# Patient Record
Sex: FEMALE | Race: WHITE | NOT HISPANIC OR LATINO | URBAN - METROPOLITAN AREA
[De-identification: names, ages, dates, MRNs, and addresses within clinical notes are randomized per-mention and may not be internally consistent; named-entity substitution may affect disease eponyms.]

---

## 2019-02-15 ENCOUNTER — OUTPATIENT (OUTPATIENT)
Dept: OUTPATIENT SERVICES | Facility: HOSPITAL | Age: 29
LOS: 1 days | End: 2019-02-15
Payer: COMMERCIAL

## 2019-02-15 VITALS
RESPIRATION RATE: 18 BRPM | HEIGHT: 65 IN | HEART RATE: 73 BPM | WEIGHT: 134.92 LBS | TEMPERATURE: 100 F | DIASTOLIC BLOOD PRESSURE: 77 MMHG | OXYGEN SATURATION: 100 % | SYSTOLIC BLOOD PRESSURE: 115 MMHG

## 2019-02-15 DIAGNOSIS — Z01.818 ENCOUNTER FOR OTHER PREPROCEDURAL EXAMINATION: ICD-10-CM

## 2019-02-15 DIAGNOSIS — Z98.82 BREAST IMPLANT STATUS: Chronic | ICD-10-CM

## 2019-02-15 DIAGNOSIS — K08.409 PARTIAL LOSS OF TEETH, UNSPECIFIED CAUSE, UNSPECIFIED CLASS: Chronic | ICD-10-CM

## 2019-02-15 DIAGNOSIS — O02.1 MISSED ABORTION: ICD-10-CM

## 2019-02-15 PROCEDURE — G0463: CPT

## 2019-02-15 PROCEDURE — 86900 BLOOD TYPING SEROLOGIC ABO: CPT

## 2019-02-15 PROCEDURE — 85027 COMPLETE CBC AUTOMATED: CPT

## 2019-02-15 PROCEDURE — 86850 RBC ANTIBODY SCREEN: CPT

## 2019-02-15 PROCEDURE — 86901 BLOOD TYPING SEROLOGIC RH(D): CPT

## 2019-02-15 RX ORDER — LIDOCAINE HCL 20 MG/ML
0.2 VIAL (ML) INJECTION ONCE
Qty: 0 | Refills: 0 | Status: DISCONTINUED | OUTPATIENT
Start: 2019-02-19 | End: 2019-03-06

## 2019-02-15 RX ORDER — SODIUM CHLORIDE 9 MG/ML
3 INJECTION INTRAMUSCULAR; INTRAVENOUS; SUBCUTANEOUS EVERY 8 HOURS
Qty: 0 | Refills: 0 | Status: DISCONTINUED | OUTPATIENT
Start: 2019-02-19 | End: 2019-03-06

## 2019-02-15 NOTE — H&P PST ADULT - NSANTHOSAYNRD_GEN_A_CORE
No. DE screening performed.  STOP BANG Legend: 0-2 = LOW Risk; 3-4 = INTERMEDIATE Risk; 5-8 = HIGH Risk

## 2019-02-15 NOTE — H&P PST ADULT - BLOOD AVOIDANCE/RESTRICTIONS, PROFILE
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   - UA showed impressive finding of UTI and it is complicated given long Hx of Bailey cathter for incontinent   - Previous urine culture grew Enterococcus faecalis sensitive to Penicillin   - Received fluid resuscitation ~ 2000 mL and Zosyn   - Continue with Zosyn (to cover possible Pseudomonas)   - Urine and blood remain NGTD.   none

## 2019-02-18 ENCOUNTER — TRANSCRIPTION ENCOUNTER (OUTPATIENT)
Age: 29
End: 2019-02-18

## 2019-02-19 ENCOUNTER — OUTPATIENT (OUTPATIENT)
Dept: OUTPATIENT SERVICES | Facility: HOSPITAL | Age: 29
LOS: 1 days | End: 2019-02-19
Payer: COMMERCIAL

## 2019-02-19 VITALS
SYSTOLIC BLOOD PRESSURE: 115 MMHG | TEMPERATURE: 100 F | OXYGEN SATURATION: 100 % | RESPIRATION RATE: 18 BRPM | HEART RATE: 73 BPM | WEIGHT: 134.92 LBS | DIASTOLIC BLOOD PRESSURE: 77 MMHG | HEIGHT: 65 IN

## 2019-02-19 VITALS
DIASTOLIC BLOOD PRESSURE: 52 MMHG | HEART RATE: 84 BPM | RESPIRATION RATE: 20 BRPM | TEMPERATURE: 98 F | OXYGEN SATURATION: 99 % | SYSTOLIC BLOOD PRESSURE: 92 MMHG

## 2019-02-19 DIAGNOSIS — Z98.82 BREAST IMPLANT STATUS: Chronic | ICD-10-CM

## 2019-02-19 DIAGNOSIS — O02.1 MISSED ABORTION: ICD-10-CM

## 2019-02-19 DIAGNOSIS — K08.409 PARTIAL LOSS OF TEETH, UNSPECIFIED CAUSE, UNSPECIFIED CLASS: Chronic | ICD-10-CM

## 2019-02-19 PROCEDURE — 88264 CHROMOSOME ANALYSIS 20-25: CPT

## 2019-02-19 PROCEDURE — 88291 CYTO/MOLECULAR REPORT: CPT

## 2019-02-19 PROCEDURE — 88305 TISSUE EXAM BY PATHOLOGIST: CPT

## 2019-02-19 PROCEDURE — 88305 TISSUE EXAM BY PATHOLOGIST: CPT | Mod: 26

## 2019-02-19 PROCEDURE — 88280 CHROMOSOME KARYOTYPE STUDY: CPT

## 2019-02-19 PROCEDURE — 88233 TISSUE CULTURE SKIN/BIOPSY: CPT

## 2019-02-19 PROCEDURE — 59820 CARE OF MISCARRIAGE: CPT

## 2019-02-19 RX ORDER — ACETAMINOPHEN 500 MG
1000 TABLET ORAL ONCE
Qty: 0 | Refills: 0 | Status: COMPLETED | OUTPATIENT
Start: 2019-02-19 | End: 2019-02-19

## 2019-02-19 RX ORDER — SODIUM CHLORIDE 9 MG/ML
1000 INJECTION, SOLUTION INTRAVENOUS
Qty: 0 | Refills: 0 | Status: DISCONTINUED | OUTPATIENT
Start: 2019-02-19 | End: 2019-03-06

## 2019-02-19 RX ORDER — CELECOXIB 200 MG/1
200 CAPSULE ORAL ONCE
Qty: 0 | Refills: 0 | Status: DISCONTINUED | OUTPATIENT
Start: 2019-02-19 | End: 2019-03-06

## 2019-02-19 RX ORDER — OXYCODONE HYDROCHLORIDE 5 MG/1
5 TABLET ORAL ONCE
Qty: 0 | Refills: 0 | Status: DISCONTINUED | OUTPATIENT
Start: 2019-02-19 | End: 2019-02-19

## 2019-02-19 RX ORDER — ONDANSETRON 8 MG/1
4 TABLET, FILM COATED ORAL ONCE
Qty: 0 | Refills: 0 | Status: DISCONTINUED | OUTPATIENT
Start: 2019-02-19 | End: 2019-03-06

## 2019-02-19 RX ORDER — CELECOXIB 200 MG/1
200 CAPSULE ORAL ONCE
Qty: 0 | Refills: 0 | Status: COMPLETED | OUTPATIENT
Start: 2019-02-19 | End: 2019-02-19

## 2019-02-19 RX ORDER — FAMOTIDINE 10 MG/ML
20 INJECTION INTRAVENOUS ONCE
Qty: 0 | Refills: 0 | Status: COMPLETED | OUTPATIENT
Start: 2019-02-19 | End: 2019-02-19

## 2019-02-19 RX ADMIN — SODIUM CHLORIDE 3 MILLILITER(S): 9 INJECTION INTRAMUSCULAR; INTRAVENOUS; SUBCUTANEOUS at 08:52

## 2019-02-19 RX ADMIN — FAMOTIDINE 20 MILLIGRAM(S): 10 INJECTION INTRAVENOUS at 09:16

## 2019-02-19 RX ADMIN — Medication 1000 MILLIGRAM(S): at 08:49

## 2019-02-19 RX ADMIN — CELECOXIB 200 MILLIGRAM(S): 200 CAPSULE ORAL at 08:50

## 2019-02-19 NOTE — ASU DISCHARGE PLAN (ADULT/PEDIATRIC). - NOTIFY
Bleeding that does not stop/Fever greater than 101 Bleeding that does not stop/GYN Fever>100.4/Unable to Urinate

## 2019-02-19 NOTE — ASU PATIENT PROFILE, ADULT - PRESSURE ULCER(S)
Assumed care of pt @ 3130. JUMA drain removed per order, midline placed, and cordis removed. Pt converted to Afib at 1015. Cardiology aware. See orders. Pt asymptomatic, rate controlled. Transfer orders in.  Report called, all pt belongings taken up to CTU no

## 2019-02-19 NOTE — BRIEF OPERATIVE NOTE - PROCEDURE
<<-----Click on this checkbox to enter Procedure Dilatation and curettage  02/19/2019    Active  SISSYARO

## 2019-02-20 LAB — SURGICAL PATHOLOGY STUDY: SIGNIFICANT CHANGE UP

## 2019-03-06 LAB — CHROM ANALY OVERALL INTERP SPEC-IMP: SIGNIFICANT CHANGE UP

## 2019-03-13 PROBLEM — Z00.00 ENCOUNTER FOR PREVENTIVE HEALTH EXAMINATION: Status: ACTIVE | Noted: 2019-03-13

## 2019-03-27 ENCOUNTER — APPOINTMENT (OUTPATIENT)
Dept: ORTHOPEDIC SURGERY | Facility: CLINIC | Age: 29
End: 2019-03-27
Payer: COMMERCIAL

## 2019-03-27 VITALS
HEART RATE: 79 BPM | SYSTOLIC BLOOD PRESSURE: 122 MMHG | HEIGHT: 65 IN | BODY MASS INDEX: 22.49 KG/M2 | WEIGHT: 135 LBS | DIASTOLIC BLOOD PRESSURE: 77 MMHG

## 2019-03-27 DIAGNOSIS — Z78.9 OTHER SPECIFIED HEALTH STATUS: ICD-10-CM

## 2019-03-27 DIAGNOSIS — Z82.61 FAMILY HISTORY OF ARTHRITIS: ICD-10-CM

## 2019-03-27 DIAGNOSIS — M22.41 CHONDROMALACIA PATELLAE, RIGHT KNEE: ICD-10-CM

## 2019-03-27 DIAGNOSIS — Z80.3 FAMILY HISTORY OF MALIGNANT NEOPLASM OF BREAST: ICD-10-CM

## 2019-03-27 PROCEDURE — 73564 X-RAY EXAM KNEE 4 OR MORE: CPT | Mod: RT

## 2019-03-27 PROCEDURE — 99203 OFFICE O/P NEW LOW 30 MIN: CPT

## 2019-03-27 RX ORDER — PNV/FERROUS SULFATE/FOLIC ACID 27-<0.5MG
TABLET ORAL
Refills: 0 | Status: ACTIVE | COMMUNITY

## 2019-03-27 NOTE — PHYSICAL EXAM
[de-identified] : General Exam\par \par Well developed, well nourished\par No apparent distress\par Oriented to person, place, and time\par Mood: Normal\par Affect: Normal\par Balance and coordination: Normal\par Gait: Normal\par \par right knee exam\par \par Skin: Clean, dry, intact\par Inspection: No obvious malalignment, no masses, no swelling, no effusion.\par Tenderness: no MJLT. No LJLT. + tenderness over the medial and lateral patella facets. No ttp medial/lateral epicondyle, patella tendon, tibial tubercle, pes anserinus, or proximal fibula.\par ROM: 0 to 130° no pain with deep flexion in both knees\par Stability: Stable to varus, valgus, lachman testing. Negative anterior/posterior drawer.\par Additional tests: Negative McMurrays test, Negative patellar grind test. \par Strength: 5/5 Q/H/TA/GS/EHL, no atrophy\par Neuro: In tact to light touch throughout in dp/sp/tib/siohban/saph nerve districutions, DTR's normal\par Pulses: 2+ DP/PT pulses.\par  [de-identified] : \par The following radiographs were ordered and read by me during this patients visit. I reviewed each radiograph in detail with the patient and discussed the findings as highlighted below. \par \par 4 views right knee] were obtained today that show no fracture, dislocation. There is no degenerative change seen. There is no malalignment. No obvious osseous abnormality. Otherwise unremarkable.\par

## 2019-03-27 NOTE — DISCUSSION/SUMMARY
[de-identified] : \par 28-year-old female right knee patellofemoral pain. Given home exercise program anti-inflammatory medications as needed followup as needed. All questions answered

## 2019-03-27 NOTE — HISTORY OF PRESENT ILLNESS
[de-identified] : 28 y.o F presents to the office today c.o of R knee pain lasting about 1 month or less. She denies any traumatic BRODERICK but believes she injured it while weight lifting early March. She describes most of the pain towards the lateral aspect of her patella, and the lateral side of her R thigh along the distal IT band. She complains of pain with deep squatting. States she has not had to stop working out and her ADL's are not affected. Denies numbness and tingling. No significant PHI.\par \par The patient's past medical history, past surgical history, medications, allergies, and social history were reviewed by me today with the patient and documented accordingly. In addition, the patient's family history, which is noncontributory to this visit, was also reviewed.\par

## 2019-09-12 ENCOUNTER — APPOINTMENT (OUTPATIENT)
Dept: ANTEPARTUM | Facility: CLINIC | Age: 29
End: 2019-09-12
Payer: COMMERCIAL

## 2019-09-12 ENCOUNTER — ASOB RESULT (OUTPATIENT)
Age: 29
End: 2019-09-12

## 2019-09-12 PROCEDURE — 76817 TRANSVAGINAL US OBSTETRIC: CPT | Mod: 59

## 2019-09-12 PROCEDURE — 76811 OB US DETAILED SNGL FETUS: CPT

## 2019-09-18 ENCOUNTER — APPOINTMENT (OUTPATIENT)
Dept: ANTEPARTUM | Facility: CLINIC | Age: 29
End: 2019-09-18
Payer: COMMERCIAL

## 2019-09-18 ENCOUNTER — ASOB RESULT (OUTPATIENT)
Age: 29
End: 2019-09-18

## 2019-09-18 PROCEDURE — 76815 OB US LIMITED FETUS(S): CPT

## 2019-10-17 ENCOUNTER — APPOINTMENT (OUTPATIENT)
Dept: ANTEPARTUM | Facility: CLINIC | Age: 29
End: 2019-10-17
Payer: COMMERCIAL

## 2019-10-17 ENCOUNTER — ASOB RESULT (OUTPATIENT)
Age: 29
End: 2019-10-17

## 2019-10-17 PROCEDURE — 76816 OB US FOLLOW-UP PER FETUS: CPT

## 2019-11-20 ENCOUNTER — ASOB RESULT (OUTPATIENT)
Age: 29
End: 2019-11-20

## 2019-11-20 ENCOUNTER — APPOINTMENT (OUTPATIENT)
Dept: ANTEPARTUM | Facility: CLINIC | Age: 29
End: 2019-11-20
Payer: COMMERCIAL

## 2019-11-20 PROCEDURE — 76816 OB US FOLLOW-UP PER FETUS: CPT

## 2019-11-22 ENCOUNTER — APPOINTMENT (OUTPATIENT)
Dept: ANTEPARTUM | Facility: CLINIC | Age: 29
End: 2019-11-22

## 2019-11-25 ENCOUNTER — APPOINTMENT (OUTPATIENT)
Dept: ANTEPARTUM | Facility: CLINIC | Age: 29
End: 2019-11-25

## 2019-12-19 ENCOUNTER — APPOINTMENT (OUTPATIENT)
Dept: ANTEPARTUM | Facility: CLINIC | Age: 29
End: 2019-12-19
Payer: COMMERCIAL

## 2019-12-19 ENCOUNTER — ASOB RESULT (OUTPATIENT)
Age: 29
End: 2019-12-19

## 2019-12-19 PROCEDURE — 76816 OB US FOLLOW-UP PER FETUS: CPT

## 2020-02-01 ENCOUNTER — INPATIENT (INPATIENT)
Facility: HOSPITAL | Age: 30
LOS: 1 days | Discharge: HOME | End: 2020-02-03
Attending: OBSTETRICS & GYNECOLOGY | Admitting: OBSTETRICS & GYNECOLOGY
Payer: COMMERCIAL

## 2020-02-01 VITALS — HEIGHT: 65 IN | WEIGHT: 158.73 LBS

## 2020-02-01 DIAGNOSIS — Z98.82 BREAST IMPLANT STATUS: Chronic | ICD-10-CM

## 2020-02-01 DIAGNOSIS — K08.409 PARTIAL LOSS OF TEETH, UNSPECIFIED CAUSE, UNSPECIFIED CLASS: Chronic | ICD-10-CM

## 2020-02-01 DIAGNOSIS — Z34.80 ENCOUNTER FOR SUPERVISION OF OTHER NORMAL PREGNANCY, UNSPECIFIED TRIMESTER: ICD-10-CM

## 2020-02-01 DIAGNOSIS — O26.899 OTHER SPECIFIED PREGNANCY RELATED CONDITIONS, UNSPECIFIED TRIMESTER: ICD-10-CM

## 2020-02-01 DIAGNOSIS — Z3A.00 WEEKS OF GESTATION OF PREGNANCY NOT SPECIFIED: ICD-10-CM

## 2020-02-01 LAB
ALBUMIN SERPL ELPH-MCNC: 3.8 G/DL — SIGNIFICANT CHANGE UP (ref 3.3–5)
ALP SERPL-CCNC: 212 U/L — HIGH (ref 40–120)
ALT FLD-CCNC: 10 U/L — SIGNIFICANT CHANGE UP (ref 10–45)
ANION GAP SERPL CALC-SCNC: 13 MMOL/L — SIGNIFICANT CHANGE UP (ref 5–17)
APTT BLD: 27.8 SEC — SIGNIFICANT CHANGE UP (ref 27.5–36.3)
AST SERPL-CCNC: 20 U/L — SIGNIFICANT CHANGE UP (ref 10–40)
BASOPHILS # BLD AUTO: 0.05 K/UL — SIGNIFICANT CHANGE UP (ref 0–0.2)
BASOPHILS NFR BLD AUTO: 0.4 % — SIGNIFICANT CHANGE UP (ref 0–2)
BILIRUB SERPL-MCNC: 0.2 MG/DL — SIGNIFICANT CHANGE UP (ref 0.2–1.2)
BLD GP AB SCN SERPL QL: NEGATIVE — SIGNIFICANT CHANGE UP
BUN SERPL-MCNC: 13 MG/DL — SIGNIFICANT CHANGE UP (ref 7–23)
CALCIUM SERPL-MCNC: 9.9 MG/DL — SIGNIFICANT CHANGE UP (ref 8.4–10.5)
CHLORIDE SERPL-SCNC: 100 MMOL/L — SIGNIFICANT CHANGE UP (ref 96–108)
CO2 SERPL-SCNC: 23 MMOL/L — SIGNIFICANT CHANGE UP (ref 22–31)
CREAT ?TM UR-MCNC: 64 MG/DL — SIGNIFICANT CHANGE UP
CREAT SERPL-MCNC: 0.83 MG/DL — SIGNIFICANT CHANGE UP (ref 0.5–1.3)
EOSINOPHIL # BLD AUTO: 0.04 K/UL — SIGNIFICANT CHANGE UP (ref 0–0.5)
EOSINOPHIL NFR BLD AUTO: 0.3 % — SIGNIFICANT CHANGE UP (ref 0–6)
FIBRINOGEN PPP-MCNC: 670 MG/DL — HIGH (ref 350–510)
GLUCOSE SERPL-MCNC: 82 MG/DL — SIGNIFICANT CHANGE UP (ref 70–99)
HCT VFR BLD CALC: 43.9 % — SIGNIFICANT CHANGE UP (ref 34.5–45)
HGB BLD-MCNC: 14.2 G/DL — SIGNIFICANT CHANGE UP (ref 11.5–15.5)
IMM GRANULOCYTES NFR BLD AUTO: 0.5 % — SIGNIFICANT CHANGE UP (ref 0–1.5)
INR BLD: 0.99 RATIO — SIGNIFICANT CHANGE UP (ref 0.88–1.16)
LDH SERPL L TO P-CCNC: 202 U/L — SIGNIFICANT CHANGE UP (ref 50–242)
LYMPHOCYTES # BLD AUTO: 15 % — SIGNIFICANT CHANGE UP (ref 13–44)
LYMPHOCYTES # BLD AUTO: 2.03 K/UL — SIGNIFICANT CHANGE UP (ref 1–3.3)
MCHC RBC-ENTMCNC: 28.3 PG — SIGNIFICANT CHANGE UP (ref 27–34)
MCHC RBC-ENTMCNC: 32.3 GM/DL — SIGNIFICANT CHANGE UP (ref 32–36)
MCV RBC AUTO: 87.6 FL — SIGNIFICANT CHANGE UP (ref 80–100)
MONOCYTES # BLD AUTO: 0.71 K/UL — SIGNIFICANT CHANGE UP (ref 0–0.9)
MONOCYTES NFR BLD AUTO: 5.2 % — SIGNIFICANT CHANGE UP (ref 2–14)
NEUTROPHILS # BLD AUTO: 10.67 K/UL — HIGH (ref 1.8–7.4)
NEUTROPHILS NFR BLD AUTO: 78.6 % — HIGH (ref 43–77)
NRBC # BLD: 0 /100 WBCS — SIGNIFICANT CHANGE UP (ref 0–0)
PLATELET # BLD AUTO: 276 K/UL — SIGNIFICANT CHANGE UP (ref 150–400)
POTASSIUM SERPL-MCNC: 4.3 MMOL/L — SIGNIFICANT CHANGE UP (ref 3.5–5.3)
POTASSIUM SERPL-SCNC: 4.3 MMOL/L — SIGNIFICANT CHANGE UP (ref 3.5–5.3)
PROT ?TM UR-MCNC: 9 MG/DL — SIGNIFICANT CHANGE UP (ref 0–12)
PROT SERPL-MCNC: 7.6 G/DL — SIGNIFICANT CHANGE UP (ref 6–8.3)
PROT/CREAT UR-RTO: 0.1 RATIO — SIGNIFICANT CHANGE UP (ref 0–0.2)
PROTHROM AB SERPL-ACNC: 11.3 SEC — SIGNIFICANT CHANGE UP (ref 10–12.9)
RBC # BLD: 5.01 M/UL — SIGNIFICANT CHANGE UP (ref 3.8–5.2)
RBC # FLD: 12.6 % — SIGNIFICANT CHANGE UP (ref 10.3–14.5)
RH IG SCN BLD-IMP: POSITIVE — SIGNIFICANT CHANGE UP
SODIUM SERPL-SCNC: 136 MMOL/L — SIGNIFICANT CHANGE UP (ref 135–145)
T PALLIDUM AB TITR SER: NEGATIVE — SIGNIFICANT CHANGE UP
URATE SERPL-MCNC: 6 MG/DL — SIGNIFICANT CHANGE UP (ref 2.5–7)
WBC # BLD: 13.57 K/UL — HIGH (ref 3.8–10.5)
WBC # FLD AUTO: 13.57 K/UL — HIGH (ref 3.8–10.5)

## 2020-02-01 PROCEDURE — 88307 TISSUE EXAM BY PATHOLOGIST: CPT | Mod: 26

## 2020-02-01 RX ORDER — OXYTOCIN 10 UNIT/ML
333.33 VIAL (ML) INJECTION
Qty: 20 | Refills: 0 | Status: DISCONTINUED | OUTPATIENT
Start: 2020-02-01 | End: 2020-02-03

## 2020-02-01 RX ORDER — AMPICILLIN TRIHYDRATE 250 MG
1 CAPSULE ORAL EVERY 4 HOURS
Refills: 0 | Status: DISCONTINUED | OUTPATIENT
Start: 2020-02-01 | End: 2020-02-01

## 2020-02-01 RX ORDER — AMPICILLIN TRIHYDRATE 250 MG
2 CAPSULE ORAL ONCE
Refills: 0 | Status: COMPLETED | OUTPATIENT
Start: 2020-02-01 | End: 2020-02-01

## 2020-02-01 RX ORDER — SODIUM CHLORIDE 9 MG/ML
3 INJECTION INTRAMUSCULAR; INTRAVENOUS; SUBCUTANEOUS EVERY 8 HOURS
Refills: 0 | Status: DISCONTINUED | OUTPATIENT
Start: 2020-02-01 | End: 2020-02-03

## 2020-02-01 RX ORDER — IBUPROFEN 200 MG
600 TABLET ORAL EVERY 6 HOURS
Refills: 0 | Status: DISCONTINUED | OUTPATIENT
Start: 2020-02-01 | End: 2020-02-03

## 2020-02-01 RX ORDER — GLYCERIN ADULT
1 SUPPOSITORY, RECTAL RECTAL AT BEDTIME
Refills: 0 | Status: DISCONTINUED | OUTPATIENT
Start: 2020-02-01 | End: 2020-02-03

## 2020-02-01 RX ORDER — ACETAMINOPHEN 500 MG
1000 TABLET ORAL ONCE
Refills: 0 | Status: COMPLETED | OUTPATIENT
Start: 2020-02-01 | End: 2020-02-01

## 2020-02-01 RX ORDER — BENZOCAINE 10 %
1 GEL (GRAM) MUCOUS MEMBRANE EVERY 6 HOURS
Refills: 0 | Status: DISCONTINUED | OUTPATIENT
Start: 2020-02-01 | End: 2020-02-03

## 2020-02-01 RX ORDER — SIMETHICONE 80 MG/1
80 TABLET, CHEWABLE ORAL EVERY 4 HOURS
Refills: 0 | Status: DISCONTINUED | OUTPATIENT
Start: 2020-02-01 | End: 2020-02-03

## 2020-02-01 RX ORDER — OXYCODONE HYDROCHLORIDE 5 MG/1
5 TABLET ORAL ONCE
Refills: 0 | Status: DISCONTINUED | OUTPATIENT
Start: 2020-02-01 | End: 2020-02-03

## 2020-02-01 RX ORDER — OXYCODONE HYDROCHLORIDE 5 MG/1
5 TABLET ORAL
Refills: 0 | Status: DISCONTINUED | OUTPATIENT
Start: 2020-02-01 | End: 2020-02-03

## 2020-02-01 RX ORDER — DIBUCAINE 1 %
1 OINTMENT (GRAM) RECTAL EVERY 6 HOURS
Refills: 0 | Status: DISCONTINUED | OUTPATIENT
Start: 2020-02-01 | End: 2020-02-03

## 2020-02-01 RX ORDER — HYDROCORTISONE 1 %
1 OINTMENT (GRAM) TOPICAL EVERY 6 HOURS
Refills: 0 | Status: DISCONTINUED | OUTPATIENT
Start: 2020-02-01 | End: 2020-02-03

## 2020-02-01 RX ORDER — ACETAMINOPHEN 500 MG
975 TABLET ORAL
Refills: 0 | Status: DISCONTINUED | OUTPATIENT
Start: 2020-02-01 | End: 2020-02-03

## 2020-02-01 RX ORDER — SODIUM CHLORIDE 9 MG/ML
1000 INJECTION, SOLUTION INTRAVENOUS
Refills: 0 | Status: DISCONTINUED | OUTPATIENT
Start: 2020-02-01 | End: 2020-02-01

## 2020-02-01 RX ORDER — LANOLIN
1 OINTMENT (GRAM) TOPICAL EVERY 6 HOURS
Refills: 0 | Status: DISCONTINUED | OUTPATIENT
Start: 2020-02-01 | End: 2020-02-03

## 2020-02-01 RX ORDER — GENTAMICIN SULFATE 40 MG/ML
315 VIAL (ML) INJECTION ONCE
Refills: 0 | Status: COMPLETED | OUTPATIENT
Start: 2020-02-01 | End: 2020-02-01

## 2020-02-01 RX ORDER — PRAMOXINE HYDROCHLORIDE 150 MG/15G
1 AEROSOL, FOAM RECTAL EVERY 4 HOURS
Refills: 0 | Status: DISCONTINUED | OUTPATIENT
Start: 2020-02-01 | End: 2020-02-03

## 2020-02-01 RX ORDER — IBUPROFEN 200 MG
600 TABLET ORAL EVERY 6 HOURS
Refills: 0 | Status: COMPLETED | OUTPATIENT
Start: 2020-02-01 | End: 2020-12-30

## 2020-02-01 RX ORDER — OXYTOCIN 10 UNIT/ML
333.33 VIAL (ML) INJECTION
Qty: 20 | Refills: 0 | Status: COMPLETED | OUTPATIENT
Start: 2020-02-01 | End: 2020-02-01

## 2020-02-01 RX ORDER — DIPHENHYDRAMINE HCL 50 MG
25 CAPSULE ORAL EVERY 6 HOURS
Refills: 0 | Status: DISCONTINUED | OUTPATIENT
Start: 2020-02-01 | End: 2020-02-03

## 2020-02-01 RX ORDER — TETANUS TOXOID, REDUCED DIPHTHERIA TOXOID AND ACELLULAR PERTUSSIS VACCINE, ADSORBED 5; 2.5; 8; 8; 2.5 [IU]/.5ML; [IU]/.5ML; UG/.5ML; UG/.5ML; UG/.5ML
0.5 SUSPENSION INTRAMUSCULAR ONCE
Refills: 0 | Status: DISCONTINUED | OUTPATIENT
Start: 2020-02-01 | End: 2020-02-03

## 2020-02-01 RX ORDER — KETOROLAC TROMETHAMINE 30 MG/ML
30 SYRINGE (ML) INJECTION ONCE
Refills: 0 | Status: DISCONTINUED | OUTPATIENT
Start: 2020-02-01 | End: 2020-02-01

## 2020-02-01 RX ORDER — MAGNESIUM HYDROXIDE 400 MG/1
30 TABLET, CHEWABLE ORAL
Refills: 0 | Status: DISCONTINUED | OUTPATIENT
Start: 2020-02-01 | End: 2020-02-03

## 2020-02-01 RX ORDER — AER TRAVELER 0.5 G/1
1 SOLUTION RECTAL; TOPICAL EVERY 4 HOURS
Refills: 0 | Status: DISCONTINUED | OUTPATIENT
Start: 2020-02-01 | End: 2020-02-03

## 2020-02-01 RX ORDER — SODIUM CHLORIDE 9 MG/ML
1000 INJECTION, SOLUTION INTRAVENOUS
Refills: 0 | Status: DISCONTINUED | OUTPATIENT
Start: 2020-02-01 | End: 2020-02-03

## 2020-02-01 RX ORDER — CITRIC ACID/SODIUM CITRATE 300-500 MG
15 SOLUTION, ORAL ORAL EVERY 6 HOURS
Refills: 0 | Status: DISCONTINUED | OUTPATIENT
Start: 2020-02-01 | End: 2020-02-01

## 2020-02-01 RX ORDER — OXYTOCIN 10 UNIT/ML
4 VIAL (ML) INJECTION
Qty: 30 | Refills: 0 | Status: DISCONTINUED | OUTPATIENT
Start: 2020-02-01 | End: 2020-02-01

## 2020-02-01 RX ADMIN — Medication 4 MILLIUNIT(S)/MIN: at 15:00

## 2020-02-01 RX ADMIN — Medication 1000 MILLIUNIT(S)/MIN: at 18:24

## 2020-02-01 RX ADMIN — SODIUM CHLORIDE 125 MILLILITER(S): 9 INJECTION, SOLUTION INTRAVENOUS at 14:06

## 2020-02-01 RX ADMIN — Medication 157.88 MILLIGRAM(S): at 17:30

## 2020-02-01 RX ADMIN — Medication 400 MILLIGRAM(S): at 16:25

## 2020-02-01 RX ADMIN — Medication 975 MILLIGRAM(S): at 23:07

## 2020-02-01 RX ADMIN — Medication 30 MILLIGRAM(S): at 20:31

## 2020-02-01 RX ADMIN — Medication 108 GRAM(S): at 15:13

## 2020-02-01 RX ADMIN — Medication 975 MILLIGRAM(S): at 23:37

## 2020-02-01 RX ADMIN — Medication 216 GRAM(S): at 11:15

## 2020-02-01 RX ADMIN — SODIUM CHLORIDE 3 MILLILITER(S): 9 INJECTION INTRAMUSCULAR; INTRAVENOUS; SUBCUTANEOUS at 22:28

## 2020-02-02 LAB
HCT VFR BLD CALC: 32.7 % — LOW (ref 34.5–45)
HGB BLD-MCNC: 10.7 G/DL — LOW (ref 11.5–15.5)

## 2020-02-02 RX ORDER — LEVOTHYROXINE SODIUM 125 MCG
50 TABLET ORAL DAILY
Refills: 0 | Status: DISCONTINUED | OUTPATIENT
Start: 2020-02-02 | End: 2020-02-03

## 2020-02-02 RX ADMIN — Medication 600 MILLIGRAM(S): at 15:25

## 2020-02-02 RX ADMIN — Medication 1 TABLET(S): at 11:32

## 2020-02-02 RX ADMIN — Medication 975 MILLIGRAM(S): at 18:10

## 2020-02-02 RX ADMIN — Medication 600 MILLIGRAM(S): at 09:35

## 2020-02-02 RX ADMIN — Medication 600 MILLIGRAM(S): at 21:15

## 2020-02-02 RX ADMIN — Medication 600 MILLIGRAM(S): at 20:45

## 2020-02-02 RX ADMIN — Medication 600 MILLIGRAM(S): at 02:31

## 2020-02-02 RX ADMIN — Medication 50 MICROGRAM(S): at 08:47

## 2020-02-02 RX ADMIN — SODIUM CHLORIDE 3 MILLILITER(S): 9 INJECTION INTRAMUSCULAR; INTRAVENOUS; SUBCUTANEOUS at 06:45

## 2020-02-02 RX ADMIN — Medication 975 MILLIGRAM(S): at 06:02

## 2020-02-02 RX ADMIN — Medication 975 MILLIGRAM(S): at 17:31

## 2020-02-02 RX ADMIN — Medication 975 MILLIGRAM(S): at 05:32

## 2020-02-02 RX ADMIN — Medication 975 MILLIGRAM(S): at 23:31

## 2020-02-02 RX ADMIN — Medication 975 MILLIGRAM(S): at 11:32

## 2020-02-02 RX ADMIN — Medication 600 MILLIGRAM(S): at 03:01

## 2020-02-02 RX ADMIN — Medication 600 MILLIGRAM(S): at 08:47

## 2020-02-02 RX ADMIN — Medication 975 MILLIGRAM(S): at 12:30

## 2020-02-02 RX ADMIN — Medication 600 MILLIGRAM(S): at 14:28

## 2020-02-02 NOTE — PROGRESS NOTE ADULT - ATTENDING COMMENTS
PPD1 s/p , doing well   -Routine postpartum care   -CBC appropriate   -powers in place due to extensive repair, will remove at 7PM today.     Demi Talley, DO

## 2020-02-02 NOTE — PROGRESS NOTE ADULT - SUBJECTIVE AND OBJECTIVE BOX
OB Progress Note:  PPD#1    S: 28yo  PPD#1 s/p  w/ 2nd degree and periurethral lacerations.  Intrapartum course c/b fever 38.0 for which patient received A/G/T. Patient complains of pain and soreness but feel swell overall. She is tolerating a regular diet and passing flatus. Guerra catheter was placed and is draining adequate urine. Denies CP/SOB. Denies lightheadedness/dizziness. Denies N/V.    O:  Vitals:  Vital Signs Last 24 Hrs  T(C): 36.8 (2020 05:31), Max: 38.8 (2020 19:05)  T(F): 98.2 (2020 05:31), Max: 101.8 (2020 19:05)  HR: 68 (2020 05:31) (64 - 93)  BP: 121/79 (2020 05:31) (105/60 - 127/85)  BP(mean): --  RR: 18 (2020 05:31) (18 - 19)  SpO2: 97% (2020 05:31) (94% - 100%)    MEDICATIONS  (STANDING):  acetaminophen   Tablet .. 975 milliGRAM(s) Oral <User Schedule>  dextrose 5% + lactated ringers. 1000 milliLiter(s) (125 mL/Hr) IV Continuous <Continuous>  diphtheria/tetanus/pertussis (acellular) Vaccine (ADAcel) 0.5 milliLiter(s) IntraMuscular once  ibuprofen  Tablet. 600 milliGRAM(s) Oral every 6 hours  oxytocin Infusion 333.333 milliUNIT(s)/Min (1000 mL/Hr) IV Continuous <Continuous>  prenatal multivitamin 1 Tablet(s) Oral daily  sodium chloride 0.9% lock flush 3 milliLiter(s) IV Push every 8 hours      Labs:  Blood type: AB Positive  Rubella IgG: RPR: Negative                          14.2   13.57<H> >-----------< 276    (  @ 11:49 )             43.9    20 @ 11:49      136  |  100  |  13  ----------------------------<  82  4.3   |  23  |  0.83        Ca    9.9      2020 11:49    TPro  7.6  /  Alb  3.8  /  TBili  0.2  /  DBili  x   /  AST  20  /  ALT  10  /  AlkPhos  212<H>  20 @ 11:49        Physical Exam:  General: NAD  Abdomen: soft, non-tender, non-distended, fundus firm  Vaginal: Lochia wnl  Extremities: No erythema/edema

## 2020-02-02 NOTE — PROGRESS NOTE ADULT - PROBLEM SELECTOR PLAN 1
- continue current pain regimen  - Increase ambulation, SCDs when not ambulating  - Continue regular diet  - d/w Mari @24hrs PP (7pm)

## 2020-02-03 ENCOUNTER — TRANSCRIPTION ENCOUNTER (OUTPATIENT)
Age: 30
End: 2020-02-03

## 2020-02-03 VITALS
RESPIRATION RATE: 18 BRPM | HEART RATE: 69 BPM | TEMPERATURE: 98 F | DIASTOLIC BLOOD PRESSURE: 70 MMHG | SYSTOLIC BLOOD PRESSURE: 110 MMHG

## 2020-02-03 PROCEDURE — 85014 HEMATOCRIT: CPT

## 2020-02-03 PROCEDURE — 84550 ASSAY OF BLOOD/URIC ACID: CPT

## 2020-02-03 PROCEDURE — G0463: CPT

## 2020-02-03 PROCEDURE — 59025 FETAL NON-STRESS TEST: CPT

## 2020-02-03 PROCEDURE — 85018 HEMOGLOBIN: CPT

## 2020-02-03 PROCEDURE — 85027 COMPLETE CBC AUTOMATED: CPT

## 2020-02-03 PROCEDURE — 59050 FETAL MONITOR W/REPORT: CPT

## 2020-02-03 PROCEDURE — 85730 THROMBOPLASTIN TIME PARTIAL: CPT

## 2020-02-03 PROCEDURE — 83615 LACTATE (LD) (LDH) ENZYME: CPT

## 2020-02-03 PROCEDURE — 86780 TREPONEMA PALLIDUM: CPT

## 2020-02-03 PROCEDURE — 85384 FIBRINOGEN ACTIVITY: CPT

## 2020-02-03 PROCEDURE — 88307 TISSUE EXAM BY PATHOLOGIST: CPT

## 2020-02-03 PROCEDURE — 85610 PROTHROMBIN TIME: CPT

## 2020-02-03 PROCEDURE — 84156 ASSAY OF PROTEIN URINE: CPT

## 2020-02-03 PROCEDURE — 82570 ASSAY OF URINE CREATININE: CPT

## 2020-02-03 PROCEDURE — 80053 COMPREHEN METABOLIC PANEL: CPT

## 2020-02-03 RX ORDER — IBUPROFEN 200 MG
1 TABLET ORAL
Qty: 0 | Refills: 0 | DISCHARGE
Start: 2020-02-03

## 2020-02-03 RX ORDER — ACETAMINOPHEN 500 MG
3 TABLET ORAL
Qty: 0 | Refills: 0 | DISCHARGE
Start: 2020-02-03

## 2020-02-03 RX ADMIN — Medication 600 MILLIGRAM(S): at 09:46

## 2020-02-03 RX ADMIN — Medication 975 MILLIGRAM(S): at 00:02

## 2020-02-03 RX ADMIN — Medication 50 MICROGRAM(S): at 05:27

## 2020-02-03 RX ADMIN — Medication 600 MILLIGRAM(S): at 03:10

## 2020-02-03 RX ADMIN — Medication 600 MILLIGRAM(S): at 09:16

## 2020-02-03 RX ADMIN — Medication 975 MILLIGRAM(S): at 06:04

## 2020-02-03 RX ADMIN — Medication 975 MILLIGRAM(S): at 05:27

## 2020-02-03 RX ADMIN — Medication 975 MILLIGRAM(S): at 12:30

## 2020-02-03 RX ADMIN — Medication 600 MILLIGRAM(S): at 03:41

## 2020-02-03 RX ADMIN — Medication 1 TABLET(S): at 12:00

## 2020-02-03 RX ADMIN — Medication 975 MILLIGRAM(S): at 12:00

## 2020-02-03 NOTE — DISCHARGE NOTE OB - CARE PROVIDER_API CALL
Maryjane Vasquez (DO)  [facility] [address], [address]  [place] [id number]  Phone: [phone]  Fax: [phone]  Follow Up Time:

## 2020-02-03 NOTE — PROGRESS NOTE ADULT - PROBLEM SELECTOR PLAN 1
Plan:   - Increase ambulation, SCDs when not ambulating  - Continue regular diet  - Continue PO pain regimen: Ibuprofen, Acetaminophen and Oxycodone PRN  - Discharge planning      [place], PGY-1

## 2020-02-03 NOTE — DISCHARGE NOTE OB - ADDITIONAL INSTRUCTIONS
Please call if you have fever above 100.4, pain uncontrolled with pain medicine, bleeding soaking through two pads an hour for two hours.

## 2020-02-03 NOTE — DISCHARGE NOTE OB - CARE PLAN
Principal Discharge DX:	Vaginal delivery  Goal:	ambulate and pain control  Assessment and plan of treatment:	ambulate and pain control

## 2020-02-03 NOTE — PROGRESS NOTE ADULT - ASSESSMENT
30yo now postpartum day 2 from a  c/b periurethral and 2nd degree lacerations, as well as intrapartum temperature (>24hrs afebrile) recovering well.

## 2020-02-03 NOTE — DISCHARGE NOTE OB - HOSPITAL COURSE
Patient admitted for labor. Patient had powers in for 24 hours after delivery due to periurethral laceration close to urethra. Voided spontaneously after powers removal. Discharged home on post partum day 2.

## 2020-02-03 NOTE — DISCHARGE NOTE OB - PATIENT PORTAL LINK FT
You can access the FollowMyHealth Patient Portal offered by [facility] by registering at the following website: [email or web address]. By joining [facility] [facility] [employer]’s FollowMyHealth portal, you will also be able to view your health information using other applications (apps) compatible with our system.

## 2020-02-03 NOTE — PROGRESS NOTE ADULT - SUBJECTIVE AND OBJECTIVE BOX
OB Attending Note    S: Patient doing well. Minimal lochia. Pain controlled. No dizziness, lightheaded. Ambulating and tolerating PO. Voiding spontaneously and without difficulty    O: Vital Signs Last 24 Hrs  T(C): 36.7 (03 Feb 2020 05:23), Max: 36.9 (02 Feb 2020 17:15)  T(F): 98 (03 Feb 2020 05:23), Max: 98.4 (02 Feb 2020 17:15)  HR: 69 (03 Feb 2020 05:23) (69 - 98)  BP: 110/70 (03 Feb 2020 05:23) (110/70 - 112/72)  BP(mean): --  RR: 18 (03 Feb 2020 05:23) (18 - 18)  SpO2: --    Gen: NAD  Abd: soft, NT, ND, fundus firm below umbilicus  Lochia: min  Perineum healing well  Ext: no tenderness    Labs:                        10.7   x     )-----------( x        ( 02 Feb 2020 06:25 )             32.7

## 2020-02-03 NOTE — PROGRESS NOTE ADULT - SUBJECTIVE AND OBJECTIVE BOX
30yo now PPD#2 after  c/b second degree and periurethral lacerations, as well as intrapartum fever of 38.0. Patient is now s/p A/G/T (intrapartum) and has been afebrile >24hrs. Her Guerra catheter was removed at 7PM 2/, 24hrs postpartum, and she is voiding freely.  Patient denies current complaints, her pain is well controlled. States she is ambulating, voiding spontaneously, passing gas, and tolerating regular diet. Denies CP, SOB, N/V, HA, blurred vision, epigastric pain.    Vital Signs Last 24 Hours  T(C): 36.9 (20 @ 17:15), Max: 37 (20 @ 09:00)  HR: 98 (20 @ 17:15) (68 - 98)  BP: 112/72 (20 @ 17:15) (112/72 - 121/80)  RR: 18 (20 @ 17:15) (18 - 18)  SpO2: 96% (20 @ 09:00) (96% - 97%)    Physical Exam:  General: NAD, resting comfortably in bed   Abdomen: Soft, non-tender, non-distended, fundus firm  Extremities: Full ROM, moving all extremities spontaneously  Pelvic: Lochia wnl    Labs:    Blood Type: AB Positive  Antibody Screen: Negative  RPR: Negative               10.7   x     )-----------( x        (  @ 06:25 )             32.7                14.2   13.57 )-----------( 276      (  @ 11:49 )             43.9         MEDICATIONS  (STANDING):  acetaminophen   Tablet .. 975 milliGRAM(s) Oral <User Schedule>  dextrose 5% + lactated ringers. 1000 milliLiter(s) (125 mL/Hr) IV Continuous <Continuous>  diphtheria/tetanus/pertussis (acellular) Vaccine (ADAcel) 0.5 milliLiter(s) IntraMuscular once  ibuprofen  Tablet. 600 milliGRAM(s) Oral every 6 hours  levothyroxine 50 MICROGram(s) Oral daily  oxytocin Infusion 333.333 milliUNIT(s)/Min (1000 mL/Hr) IV Continuous <Continuous>  prenatal multivitamin 1 Tablet(s) Oral daily  sodium chloride 0.9% lock flush 3 milliLiter(s) IV Push every 8 hours    MEDICATIONS  (PRN):  benzocaine 20%/menthol 0.5% Spray 1 Spray(s) Topical every 6 hours PRN for Perineal discomfort  dibucaine 1% Ointment 1 Application(s) Topical every 6 hours PRN Perineal discomfort  diphenhydrAMINE 25 milliGRAM(s) Oral every 6 hours PRN Pruritus  glycerin Suppository - Adult 1 Suppository(s) Rectal at bedtime PRN Constipation  hydrocortisone 1% Cream 1 Application(s) Topical every 6 hours PRN Moderate Pain (4-6)  lanolin Ointment 1 Application(s) Topical every 6 hours PRN nipple soreness  magnesium hydroxide Suspension 30 milliLiter(s) Oral two times a day PRN Constipation  oxyCODONE    IR 5 milliGRAM(s) Oral every 3 hours PRN Moderate to Severe Pain (4-10)  oxyCODONE    IR 5 milliGRAM(s) Oral once PRN Moderate to Severe Pain (4-10)  pramoxine 1%/zinc 5% Cream 1 Application(s) Topical every 4 hours PRN Moderate Pain (4-6)  simethicone 80 milliGRAM(s) Chew every 4 hours PRN Gas  witch hazel Pads 1 Application(s) Topical every 4 hours PRN Perineal discomfort

## 2020-02-15 LAB — SURGICAL PATHOLOGY STUDY: SIGNIFICANT CHANGE UP

## 2020-03-13 ENCOUNTER — RESULT REVIEW (OUTPATIENT)
Age: 30
End: 2020-03-13

## 2020-06-04 NOTE — DISCHARGE NOTE OB - REDNESS, SWELLING, YELLOW-GREEN OR BLOODY DISCHARGE FROM YOUR INCISION
Arielle Montenegro is requesting to transfer care to [facility] from her previous clinic [facility] because se estates she does not like the clinic and providers.  Initial OB visit note on 20, prior to that she had care with [facility] per OV notes.  Per [facility] protocol, all transfer prenatal patients that are equal to or greater than 24 weeks need prior approval from [facility] before scheduling any prenatal appointments.      3 Para 2    EDC 20, states she is scheduled for c/s 7/3/20    U/S done? Yes, see imaging tab    Previous C-sec? Yes, 2 previous c-sections, one with us in 2015    List all major health problems: Pt is Type 1 diabetic, microalbuminuria    List all complications of past deliveries: ( Ask specifically about Gestational Diabetes, HTN and preeclampsia) Preeclampsia with first pregnancy, not with second one     List all current pregnancy issues: (Again, ask specifically about Gestational Diabetes, HTN and preeclampsia) Type 1 diabetic    List current medication list: Baby ASA, PNV, insulin    Per protocol, message routed to MD on-call for direction.     I advised pt we may not be able to accept her care due to the complexity and late stage in her pregnancy.    Asha Cadet RN      
Called pt to notify that we would accept transfer.  Wanted to make sure she could still have her  on 7/3 because she wants a holiday baby.  Informed pt that that would be discussed and decided with new provider.  At this point decided to not transfer and continue care at [facility].  Heather Jhaveri RN    
I will accept!   Dr. Destinee Villarreal, DO    Obstetrics and Gynecology  [facility] [place]         
Statement Selected

## 2021-03-05 ENCOUNTER — TRANSCRIPTION ENCOUNTER (OUTPATIENT)
Age: 31
End: 2021-03-05

## 2021-03-14 ENCOUNTER — TRANSCRIPTION ENCOUNTER (OUTPATIENT)
Age: 31
End: 2021-03-14

## 2021-04-20 ENCOUNTER — RESULT REVIEW (OUTPATIENT)
Age: 31
End: 2021-04-20

## 2022-01-12 ENCOUNTER — APPOINTMENT (OUTPATIENT)
Dept: ANTEPARTUM | Facility: CLINIC | Age: 32
End: 2022-01-12
Payer: COMMERCIAL

## 2022-01-12 ENCOUNTER — ASOB RESULT (OUTPATIENT)
Age: 32
End: 2022-01-12

## 2022-01-12 PROCEDURE — 76817 TRANSVAGINAL US OBSTETRIC: CPT

## 2022-01-12 PROCEDURE — 76805 OB US >/= 14 WKS SNGL FETUS: CPT

## 2022-01-26 ENCOUNTER — APPOINTMENT (OUTPATIENT)
Dept: ANTEPARTUM | Facility: CLINIC | Age: 32
End: 2022-01-26
Payer: COMMERCIAL

## 2022-01-26 ENCOUNTER — ASOB RESULT (OUTPATIENT)
Age: 32
End: 2022-01-26

## 2022-01-26 PROCEDURE — 76816 OB US FOLLOW-UP PER FETUS: CPT

## 2022-01-31 NOTE — PATIENT PROFILE OB - HEIGHT IN INCHES
5 Thalidomide Counseling: I discussed with the patient the risks of thalidomide including but not limited to birth defects, anxiety, weakness, chest pain, dizziness, cough and severe allergy.

## 2022-04-06 ENCOUNTER — APPOINTMENT (OUTPATIENT)
Dept: ANTEPARTUM | Facility: CLINIC | Age: 32
End: 2022-04-06

## 2022-05-02 ENCOUNTER — OUTPATIENT (OUTPATIENT)
Dept: OUTPATIENT SERVICES | Facility: HOSPITAL | Age: 32
LOS: 1 days | End: 2022-05-02
Payer: COMMERCIAL

## 2022-05-02 ENCOUNTER — ASOB RESULT (OUTPATIENT)
Age: 32
End: 2022-05-02

## 2022-05-02 ENCOUNTER — APPOINTMENT (OUTPATIENT)
Dept: ANTEPARTUM | Facility: CLINIC | Age: 32
End: 2022-05-02
Payer: COMMERCIAL

## 2022-05-02 DIAGNOSIS — Z11.52 ENCOUNTER FOR SCREENING FOR COVID-19: ICD-10-CM

## 2022-05-02 DIAGNOSIS — Z98.82 BREAST IMPLANT STATUS: Chronic | ICD-10-CM

## 2022-05-02 DIAGNOSIS — K08.409 PARTIAL LOSS OF TEETH, UNSPECIFIED CAUSE, UNSPECIFIED CLASS: Chronic | ICD-10-CM

## 2022-05-02 LAB — SARS-COV-2 RNA SPEC QL NAA+PROBE: SIGNIFICANT CHANGE UP

## 2022-05-02 PROCEDURE — 59412 ANTEPARTUM MANIPULATION: CPT

## 2022-05-02 PROCEDURE — 76816 OB US FOLLOW-UP PER FETUS: CPT

## 2022-05-02 PROCEDURE — 76819 FETAL BIOPHYS PROFIL W/O NST: CPT

## 2022-05-02 PROCEDURE — U0005: CPT

## 2022-05-02 PROCEDURE — U0003: CPT

## 2022-05-02 PROCEDURE — C9803: CPT

## 2022-05-03 ENCOUNTER — OUTPATIENT (OUTPATIENT)
Dept: OUTPATIENT SERVICES | Facility: HOSPITAL | Age: 32
LOS: 1 days | End: 2022-05-03
Payer: COMMERCIAL

## 2022-05-03 ENCOUNTER — ASOB RESULT (OUTPATIENT)
Age: 32
End: 2022-05-03

## 2022-05-03 ENCOUNTER — APPOINTMENT (OUTPATIENT)
Dept: ANTEPARTUM | Facility: CLINIC | Age: 32
End: 2022-05-03

## 2022-05-03 VITALS
SYSTOLIC BLOOD PRESSURE: 124 MMHG | HEART RATE: 94 BPM | TEMPERATURE: 99 F | DIASTOLIC BLOOD PRESSURE: 75 MMHG | RESPIRATION RATE: 18 BRPM

## 2022-05-03 VITALS — SYSTOLIC BLOOD PRESSURE: 126 MMHG | DIASTOLIC BLOOD PRESSURE: 73 MMHG | OXYGEN SATURATION: 96 % | HEART RATE: 88 BPM

## 2022-05-03 DIAGNOSIS — K08.409 PARTIAL LOSS OF TEETH, UNSPECIFIED CAUSE, UNSPECIFIED CLASS: Chronic | ICD-10-CM

## 2022-05-03 DIAGNOSIS — Z3A.00 WEEKS OF GESTATION OF PREGNANCY NOT SPECIFIED: ICD-10-CM

## 2022-05-03 DIAGNOSIS — O26.899 OTHER SPECIFIED PREGNANCY RELATED CONDITIONS, UNSPECIFIED TRIMESTER: ICD-10-CM

## 2022-05-03 DIAGNOSIS — Z98.82 BREAST IMPLANT STATUS: Chronic | ICD-10-CM

## 2022-05-03 LAB
HCT VFR BLD CALC: 38.4 % — SIGNIFICANT CHANGE UP (ref 34.5–45)
HGB BLD-MCNC: 12.8 G/DL — SIGNIFICANT CHANGE UP (ref 11.5–15.5)
MCHC RBC-ENTMCNC: 28.1 PG — SIGNIFICANT CHANGE UP (ref 27–34)
MCHC RBC-ENTMCNC: 33.3 GM/DL — SIGNIFICANT CHANGE UP (ref 32–36)
MCV RBC AUTO: 84.2 FL — SIGNIFICANT CHANGE UP (ref 80–100)
NRBC # BLD: 0 /100 WBCS — SIGNIFICANT CHANGE UP (ref 0–0)
PLATELET # BLD AUTO: 330 K/UL — SIGNIFICANT CHANGE UP (ref 150–400)
RBC # BLD: 4.56 M/UL — SIGNIFICANT CHANGE UP (ref 3.8–5.2)
RBC # FLD: 12.5 % — SIGNIFICANT CHANGE UP (ref 10.3–14.5)
WBC # BLD: 11.9 K/UL — HIGH (ref 3.8–10.5)
WBC # FLD AUTO: 11.9 K/UL — HIGH (ref 3.8–10.5)

## 2022-05-03 PROCEDURE — 85027 COMPLETE CBC AUTOMATED: CPT

## 2022-05-03 PROCEDURE — 59025 FETAL NON-STRESS TEST: CPT | Mod: 26

## 2022-05-03 PROCEDURE — G0463: CPT

## 2022-05-03 PROCEDURE — 59025 FETAL NON-STRESS TEST: CPT

## 2022-05-03 RX ADMIN — Medication 0.25 MILLIGRAM(S): at 10:50

## 2022-05-03 NOTE — PRE-ANESTHESIA EVALUATION ADULT - NSANTHPMHFT_GEN_ALL_CORE
s/p  , epidural w/o cx's  s/p D&C  attempted version this morning, epidural subsequently requested by ZITA

## 2022-05-03 NOTE — OB RN TRIAGE NOTE - FALL HARM RISK - UNIVERSAL INTERVENTIONS
Bed in lowest position, wheels locked, appropriate side rails in place/Call bell, personal items and telephone in reach/Instruct patient to call for assistance before getting out of bed or chair/Non-slip footwear when patient is out of bed/[place] to call system/Physically safe environment - no spills, clutter or unnecessary equipment/Purposeful Proactive Rounding/Room/bathroom lighting operational, light cord in reach

## 2022-05-03 NOTE — OB PROVIDER TRIAGE NOTE - NSOBPROVIDERNOTE_OBGYN_ALL_OB_FT
32y/o [id number] at 37w1d presents for scheduled ECV. Fetus in joan breech position. Fetal status reassuring  -CBC, IV lock  -For ECV  -Terb at bedside      D/w Dr. Roger bridges pgy4 30y/o [id number] at 37w1d presents for scheduled ECV. Fetus in joan breech position. Fetal status reassuring  -CBC, IV lock  -For ECV  -Terb at bedside      D/w Dr. Roger bridges pgy4      ---------  BPP performed: Breech, Anterior, MVP 4.9, 8/8  IV lock in place, CBC sent, consents signed  D/w Dr. Rochelson RFrankel PGY3 32y/o [id number] at 37w1d presents for scheduled ECV. Fetus in joan breech position. Fetal status reassuring  -CBC, IV lock  -For ECV  -Terb at bedside      D/w Dr. Roger bridges pgy4      ---------  BPP performed: Breech, Anterior, MVP 4.9, 8/8  IV lock in place, CBC sent, consents signed  D/w Dr. Rochelson RFrankel PGY3    ----  ECV attempted under epidural anesthesia, unsuccessful. Patient tolerated well. Fetal status reassuring. for 1hr NST, then home. Scheduled for pLTCS on 5/16 w/ Dr. Vasquez.     RFrankel PGY3 30y/o [id number] at 37w1d presents for scheduled ECV. Fetus in joan breech position. Fetal status reassuring  -CBC, IV lock  -For ECV  -Terb at bedside      D/w Dr. Duran and Dr. Pedro bridges pgy4      Pt s/p unsuccessful ECV  Reactive tracing x2 hours post  D/c home with labor precautions    D/w Dr. Pedro bridges pgy4      ---------  BPP performed: Breech, Anterior, MVP 4.9, 8/8  IV lock in place, CBC sent, consents signed  D/w Dr. Rochelson RFrankel PGY3    ----  ECV attempted under epidural anesthesia, unsuccessful. Patient tolerated well. Fetal status reassuring. for 1hr NST, then home. Scheduled for pLTCS on 5/16 w/ Dr. Vasquez.     RFrankel PGY3

## 2022-05-03 NOTE — OB PROVIDER TRIAGE NOTE - HISTORY OF PRESENT ILLNESS
32y/o [id number] at 37w1d presents for scheduled ECV. Pt feels well, no complaints. Denies CTX, LOF, VB. +FM  PNC uncomplicated  EFW 6#10    OBhx: 2020  7#11, MAB x2 D&C  GynHx: denies  PMHx: denies  PSHx: denies  Meds: denies  All: NKDA  SH: neg x3

## 2022-05-03 NOTE — CHART NOTE - NSCHARTNOTEFT_GEN_A_CORE
ECV attempted at bedside in triage but unsuccessful. Patient wished to try under epidural anesthesia.   Patient moved to  and epidural placed by Dr. Jane. ECV reattempted however despite multiple attempts, unable to turn fetus.   FHR reassuring throughout process. Patient placed on EFM and to undergo 1hr NST until 2pm.   Pt scheduled for pLTCS on 5/16 with Dr. Vasquez. Would like to reattempt again at that time; to be discussed with private attending.     Procedure performed with Dr. Duran  D/w Dr. Almeida RFrankel PGY3

## 2022-05-03 NOTE — OB PROVIDER TRIAGE NOTE - NSHPPHYSICALEXAM_GEN_ALL_CORE
Vital Signs Last 24 Hrs  T(C): 37.1 (03 May 2022 08:46), Max: 37.1 (03 May 2022 08:38)  T(F): 98.8 (03 May 2022 08:46), Max: 98.8 (03 May 2022 08:46)  HR: 100 (03 May 2022 09:16) (94 - 114)  BP: 124/75 (03 May 2022 08:46) (124/75 - 124/75)  BP(mean): --  RR: 18 (03 May 2022 08:46) (18 - 18)  SpO2: 99% (03 May 2022 09:16) (97% - 100%)  GA: NAD  Cards: RRR  Pulm: CTAB  Abd: soft, gravid, nontender  LE: nontender      TAUS: joan breech, anterior placenta, head maternal right     EFM: 150/mod/+accels/-decels  [place]: irritability

## 2022-05-03 NOTE — PRE-ANESTHESIA EVALUATION ADULT - NSANTHADDINFOFT_GEN_ALL_CORE
epidural anesthesia/analgesia explained to pt in detail;  risks include but not limited to HA, failure, nv injury.  All questions answered.

## 2022-05-10 ENCOUNTER — OUTPATIENT (OUTPATIENT)
Dept: OUTPATIENT SERVICES | Facility: HOSPITAL | Age: 32
LOS: 1 days | End: 2022-05-10
Payer: COMMERCIAL

## 2022-05-10 VITALS
RESPIRATION RATE: 16 BRPM | DIASTOLIC BLOOD PRESSURE: 85 MMHG | WEIGHT: 175.05 LBS | SYSTOLIC BLOOD PRESSURE: 124 MMHG | TEMPERATURE: 99 F | OXYGEN SATURATION: 97 % | HEART RATE: 81 BPM | HEIGHT: 65 IN

## 2022-05-10 DIAGNOSIS — K08.409 PARTIAL LOSS OF TEETH, UNSPECIFIED CAUSE, UNSPECIFIED CLASS: Chronic | ICD-10-CM

## 2022-05-10 DIAGNOSIS — Z29.9 ENCOUNTER FOR PROPHYLACTIC MEASURES, UNSPECIFIED: ICD-10-CM

## 2022-05-10 DIAGNOSIS — Z98.82 BREAST IMPLANT STATUS: Chronic | ICD-10-CM

## 2022-05-10 DIAGNOSIS — Z01.818 ENCOUNTER FOR OTHER PREPROCEDURAL EXAMINATION: ICD-10-CM

## 2022-05-10 DIAGNOSIS — Z98.890 OTHER SPECIFIED POSTPROCEDURAL STATES: Chronic | ICD-10-CM

## 2022-05-10 LAB
ANION GAP SERPL CALC-SCNC: 14 MMOL/L — SIGNIFICANT CHANGE UP (ref 5–17)
BLD GP AB SCN SERPL QL: NEGATIVE — SIGNIFICANT CHANGE UP
BUN SERPL-MCNC: 12 MG/DL — SIGNIFICANT CHANGE UP (ref 7–23)
CALCIUM SERPL-MCNC: 9.4 MG/DL — SIGNIFICANT CHANGE UP (ref 8.4–10.5)
CHLORIDE SERPL-SCNC: 101 MMOL/L — SIGNIFICANT CHANGE UP (ref 96–108)
CO2 SERPL-SCNC: 20 MMOL/L — LOW (ref 22–31)
CREAT SERPL-MCNC: 0.63 MG/DL — SIGNIFICANT CHANGE UP (ref 0.5–1.3)
EGFR: 120 ML/MIN/1.73M2 — SIGNIFICANT CHANGE UP
GLUCOSE SERPL-MCNC: 71 MG/DL — SIGNIFICANT CHANGE UP (ref 70–99)
HCT VFR BLD CALC: 36.9 % — SIGNIFICANT CHANGE UP (ref 34.5–45)
HGB BLD-MCNC: 12.4 G/DL — SIGNIFICANT CHANGE UP (ref 11.5–15.5)
MCHC RBC-ENTMCNC: 28.6 PG — SIGNIFICANT CHANGE UP (ref 27–34)
MCHC RBC-ENTMCNC: 33.6 GM/DL — SIGNIFICANT CHANGE UP (ref 32–36)
MCV RBC AUTO: 85.2 FL — SIGNIFICANT CHANGE UP (ref 80–100)
NRBC # BLD: 0 /100 WBCS — SIGNIFICANT CHANGE UP (ref 0–0)
PLATELET # BLD AUTO: 329 K/UL — SIGNIFICANT CHANGE UP (ref 150–400)
POTASSIUM SERPL-MCNC: 3.7 MMOL/L — SIGNIFICANT CHANGE UP (ref 3.5–5.3)
POTASSIUM SERPL-SCNC: 3.7 MMOL/L — SIGNIFICANT CHANGE UP (ref 3.5–5.3)
RBC # BLD: 4.33 M/UL — SIGNIFICANT CHANGE UP (ref 3.8–5.2)
RBC # FLD: 12.6 % — SIGNIFICANT CHANGE UP (ref 10.3–14.5)
RH IG SCN BLD-IMP: POSITIVE — SIGNIFICANT CHANGE UP
SODIUM SERPL-SCNC: 135 MMOL/L — SIGNIFICANT CHANGE UP (ref 135–145)
WBC # BLD: 11.06 K/UL — HIGH (ref 3.8–10.5)
WBC # FLD AUTO: 11.06 K/UL — HIGH (ref 3.8–10.5)

## 2022-05-10 PROCEDURE — 86900 BLOOD TYPING SEROLOGIC ABO: CPT

## 2022-05-10 PROCEDURE — 86901 BLOOD TYPING SEROLOGIC RH(D): CPT

## 2022-05-10 PROCEDURE — 85027 COMPLETE CBC AUTOMATED: CPT

## 2022-05-10 PROCEDURE — 36415 COLL VENOUS BLD VENIPUNCTURE: CPT

## 2022-05-10 PROCEDURE — G0463: CPT

## 2022-05-10 PROCEDURE — 86850 RBC ANTIBODY SCREEN: CPT

## 2022-05-10 PROCEDURE — 80048 BASIC METABOLIC PNL TOTAL CA: CPT

## 2022-05-10 RX ORDER — OXYTOCIN 10 UNIT/ML
333.33 VIAL (ML) INJECTION
Qty: 20 | Refills: 0 | Status: DISCONTINUED | OUTPATIENT
Start: 2022-05-16 | End: 2022-05-18

## 2022-05-10 RX ORDER — CEFAZOLIN SODIUM 1 G
2000 VIAL (EA) INJECTION ONCE
Refills: 0 | Status: DISCONTINUED | OUTPATIENT
Start: 2022-05-16 | End: 2022-05-18

## 2022-05-10 RX ORDER — SODIUM CHLORIDE 9 MG/ML
1000 INJECTION, SOLUTION INTRAVENOUS
Refills: 0 | Status: DISCONTINUED | OUTPATIENT
Start: 2022-05-16 | End: 2022-05-16

## 2022-05-10 NOTE — OB PST NOTE - FALL HARM RISK - UNIVERSAL INTERVENTIONS
Bed in lowest position, wheels locked, appropriate side rails in place/Call bell, personal items and telephone in reach/Instruct patient to call for assistance before getting out of bed or chair/Non-slip footwear when patient is out of bed/[place] to call system/Physically safe environment - no spills, clutter or unnecessary equipment/Purposeful Proactive Rounding/Room/bathroom lighting operational, light cord in reach

## 2022-05-10 NOTE — OB PST NOTE - PROBLEM SELECTOR PLAN 1
Primary  Section  -cbc, bmp, type and screen done at PST  -preop instructions provided. All questions answered  -type and screen on admit

## 2022-05-10 NOTE — OB PST NOTE - ASSESSMENT
EFRAINI VTE 2.0 SCORE [CLOT updated 2019]    AGE RELATED RISK FACTORS                                                       MOBILITY RELATED FACTORS  [ ] Age 41-60 years                                            (1 Point)                    [ ] Bed rest                                                        (1 Point)  [ ] Age: 61-74 years                                           (2 Points)                  [ ] Plaster cast                                                   (2 Points)  [ ] Age= 75 years                                              (3 Points)                    [ ] Bed bound for more than 72 hours                 (2 Points)    DISEASE RELATED RISK FACTORS                                               GENDER SPECIFIC FACTORS  [ ] Edema in the lower extremities                       (1 Point)              [x] Pregnancy                                                     (1 Point)  [ ] Varicose veins                                               (1 Point)                     [ ] Post-partum < 6 weeks                                   (1 Point)             [x] BMI > 25 Kg/m2                                            (1 Point)                     [ ] Hormonal therapy  or oral contraception          (1 Point)                 [ ] Sepsis (in the previous month)                        (1 Point)               [ ] History of pregnancy complications                 (1 point)  [ ] Pneumonia or serious lung disease                                               [ ] Unexplained or recurrent                     (1 Point)           (in the previous month)                               (1 Point)  [ ] Abnormal pulmonary function test                     (1 Point)                 SURGERY RELATED RISK FACTORS  [ ] Acute myocardial infarction                              (1 Point)               [x]  Section                                             (1 Point)  [ ] Congestive heart failure (in the previous month)  (1 Point)      [ ] Minor surgery                                                  (1 Point)   [ ] Inflammatory bowel disease                             (1 Point)               [ ] Arthroscopic surgery                                        (2 Points)  [ ] Central venous access                                      (2 Points)                [ ] General surgery lasting more than 45 minutes (2 points)  [ ] Malignancy- Present or previous                   (2 Points)                [ ] Elective arthroplasty                                         (5 points)    [ ] Stroke (in the previous month)                          (5 Points)                                                                                                                                                           HEMATOLOGY RELATED FACTORS                                                 TRAUMA RELATED RISK FACTORS  [ ] Prior episodes of VTE                                     (3 Points)                [ ] Fracture of the hip, pelvis, or leg                       (5 Points)  [ ] Positive family history for VTE                         (3 Points)             [ ] Acute spinal cord injury (in the previous month)  (5 Points)  [ ] Prothrombin 02079 A                                     (3 Points)               [ ] Paralysis  (less than 1 month)                             (5 Points)  [ ] Factor V Leiden                                             (3 Points)                  [ ] Multiple Trauma within 1 month                        (5 Points)  [ ] Lupus anticoagulants                                     (3 Points)                                                           [ ] Anticardiolipin antibodies                               (3 Points)                                                       [ ] High homocysteine in the blood                      (3 Points)                                             [ ] Other congenital or acquired thrombophilia      (3 Points)                                                [ ] Heparin induced thrombocytopenia                  (3 Points)                                     Total Score [ 3 ]

## 2022-05-10 NOTE — OB PST NOTE - HISTORY OF PRESENT ILLNESS
31 year old female with no significant PMH, , LMP 8/15/2021 who presents to [facility] for evaluation prior to scheduled primary  Section on 2022.  She denies fever, chills, nausea/vomiting, abdominal pain, abnormal vaginal bleeding/discharge or signs of active labor.    preop covid swab  @ [facility]

## 2022-05-10 NOTE — OB PST NOTE - NSICDXPASTSURGICALHX_GEN_ALL_CORE_FT
PAST SURGICAL HISTORY:  H/O dilation and curettage -for missed AB 2019    S/P breast augmentation -2010    [place] teeth extracted

## 2022-05-12 NOTE — OB PST NOTE - FALL HARM RISK - PT AGE POPULATION HIDDEN
Adult

## 2022-05-14 ENCOUNTER — OUTPATIENT (OUTPATIENT)
Dept: OUTPATIENT SERVICES | Facility: HOSPITAL | Age: 32
LOS: 1 days | End: 2022-05-14
Payer: COMMERCIAL

## 2022-05-14 DIAGNOSIS — Z98.890 OTHER SPECIFIED POSTPROCEDURAL STATES: Chronic | ICD-10-CM

## 2022-05-14 DIAGNOSIS — K08.409 PARTIAL LOSS OF TEETH, UNSPECIFIED CAUSE, UNSPECIFIED CLASS: Chronic | ICD-10-CM

## 2022-05-14 DIAGNOSIS — Z11.52 ENCOUNTER FOR SCREENING FOR COVID-19: ICD-10-CM

## 2022-05-14 DIAGNOSIS — Z98.82 BREAST IMPLANT STATUS: Chronic | ICD-10-CM

## 2022-05-14 LAB — SARS-COV-2 RNA SPEC QL NAA+PROBE: SIGNIFICANT CHANGE UP

## 2022-05-14 PROCEDURE — C9803: CPT

## 2022-05-14 PROCEDURE — U0003: CPT

## 2022-05-14 PROCEDURE — U0005: CPT

## 2022-05-15 ENCOUNTER — TRANSCRIPTION ENCOUNTER (OUTPATIENT)
Age: 32
End: 2022-05-15

## 2022-05-16 ENCOUNTER — INPATIENT (INPATIENT)
Facility: HOSPITAL | Age: 32
LOS: 1 days | Discharge: HOME | End: 2022-05-18
Attending: STUDENT IN AN ORGANIZED HEALTH CARE EDUCATION/TRAINING PROGRAM | Admitting: STUDENT IN AN ORGANIZED HEALTH CARE EDUCATION/TRAINING PROGRAM
Payer: COMMERCIAL

## 2022-05-16 ENCOUNTER — APPOINTMENT (OUTPATIENT)
Dept: ANTEPARTUM | Facility: CLINIC | Age: 32
End: 2022-05-16

## 2022-05-16 ENCOUNTER — ASOB RESULT (OUTPATIENT)
Age: 32
End: 2022-05-16

## 2022-05-16 ENCOUNTER — TRANSCRIPTION ENCOUNTER (OUTPATIENT)
Age: 32
End: 2022-05-16

## 2022-05-16 VITALS — HEART RATE: 81 BPM | SYSTOLIC BLOOD PRESSURE: 120 MMHG | DIASTOLIC BLOOD PRESSURE: 82 MMHG

## 2022-05-16 DIAGNOSIS — K08.409 PARTIAL LOSS OF TEETH, UNSPECIFIED CAUSE, UNSPECIFIED CLASS: Chronic | ICD-10-CM

## 2022-05-16 DIAGNOSIS — Z98.82 BREAST IMPLANT STATUS: Chronic | ICD-10-CM

## 2022-05-16 DIAGNOSIS — Z98.890 OTHER SPECIFIED POSTPROCEDURAL STATES: Chronic | ICD-10-CM

## 2022-05-16 LAB
BLD GP AB SCN SERPL QL: NEGATIVE — SIGNIFICANT CHANGE UP
COVID-19 SPIKE DOMAIN AB INTERP: POSITIVE
COVID-19 SPIKE DOMAIN ANTIBODY RESULT: >250 U/ML — HIGH
RH IG SCN BLD-IMP: POSITIVE — SIGNIFICANT CHANGE UP
SARS-COV-2 IGG+IGM SERPL QL IA: >250 U/ML — HIGH
SARS-COV-2 IGG+IGM SERPL QL IA: POSITIVE
T PALLIDUM AB TITR SER: NEGATIVE — SIGNIFICANT CHANGE UP

## 2022-05-16 PROCEDURE — 76818 FETAL BIOPHYS PROFILE W/NST: CPT | Mod: 26

## 2022-05-16 PROCEDURE — 59412 ANTEPARTUM MANIPULATION: CPT

## 2022-05-16 DEVICE — SURGICEL POWDER 3 GRAMS: Type: IMPLANTABLE DEVICE | Status: FUNCTIONAL

## 2022-05-16 RX ORDER — SODIUM CHLORIDE 9 MG/ML
1000 INJECTION, SOLUTION INTRAVENOUS ONCE
Refills: 0 | Status: COMPLETED | OUTPATIENT
Start: 2022-05-16 | End: 2022-05-16

## 2022-05-16 RX ORDER — MAGNESIUM HYDROXIDE 400 MG/1
30 TABLET, CHEWABLE ORAL
Refills: 0 | Status: DISCONTINUED | OUTPATIENT
Start: 2022-05-16 | End: 2022-05-18

## 2022-05-16 RX ORDER — LANOLIN
1 OINTMENT (GRAM) TOPICAL EVERY 6 HOURS
Refills: 0 | Status: DISCONTINUED | OUTPATIENT
Start: 2022-05-16 | End: 2022-05-18

## 2022-05-16 RX ORDER — CHLORHEXIDINE GLUCONATE 213 G/1000ML
1 SOLUTION TOPICAL ONCE
Refills: 0 | Status: COMPLETED | OUTPATIENT
Start: 2022-05-16 | End: 2022-05-16

## 2022-05-16 RX ORDER — HEPARIN SODIUM 5000 [USP'U]/ML
5000 INJECTION INTRAVENOUS; SUBCUTANEOUS EVERY 12 HOURS
Refills: 0 | Status: DISCONTINUED | OUTPATIENT
Start: 2022-05-16 | End: 2022-05-18

## 2022-05-16 RX ORDER — CITRIC ACID/SODIUM CITRATE 300-500 MG
15 SOLUTION, ORAL ORAL ONCE
Refills: 0 | Status: COMPLETED | OUTPATIENT
Start: 2022-05-16 | End: 2022-05-16

## 2022-05-16 RX ORDER — IBUPROFEN 200 MG
600 TABLET ORAL EVERY 6 HOURS
Refills: 0 | Status: COMPLETED | OUTPATIENT
Start: 2022-05-16 | End: 2023-04-14

## 2022-05-16 RX ORDER — FAMOTIDINE 10 MG/ML
20 INJECTION INTRAVENOUS ONCE
Refills: 0 | Status: COMPLETED | OUTPATIENT
Start: 2022-05-16 | End: 2022-05-16

## 2022-05-16 RX ORDER — SIMETHICONE 80 MG/1
80 TABLET, CHEWABLE ORAL EVERY 4 HOURS
Refills: 0 | Status: DISCONTINUED | OUTPATIENT
Start: 2022-05-16 | End: 2022-05-18

## 2022-05-16 RX ORDER — OXYCODONE HYDROCHLORIDE 5 MG/1
5 TABLET ORAL ONCE
Refills: 0 | Status: DISCONTINUED | OUTPATIENT
Start: 2022-05-16 | End: 2022-05-18

## 2022-05-16 RX ORDER — KETOROLAC TROMETHAMINE 30 MG/ML
30 SYRINGE (ML) INJECTION EVERY 6 HOURS
Refills: 0 | Status: DISCONTINUED | OUTPATIENT
Start: 2022-05-16 | End: 2022-05-17

## 2022-05-16 RX ORDER — MORPHINE SULFATE 50 MG/1
2 CAPSULE, EXTENDED RELEASE ORAL ONCE
Refills: 0 | Status: DISCONTINUED | OUTPATIENT
Start: 2022-05-16 | End: 2022-05-17

## 2022-05-16 RX ORDER — DIPHENHYDRAMINE HCL 50 MG
25 CAPSULE ORAL EVERY 6 HOURS
Refills: 0 | Status: DISCONTINUED | OUTPATIENT
Start: 2022-05-16 | End: 2022-05-18

## 2022-05-16 RX ORDER — TETANUS TOXOID, REDUCED DIPHTHERIA TOXOID AND ACELLULAR PERTUSSIS VACCINE, ADSORBED 5; 2.5; 8; 8; 2.5 [IU]/.5ML; [IU]/.5ML; UG/.5ML; UG/.5ML; UG/.5ML
0.5 SUSPENSION INTRAMUSCULAR ONCE
Refills: 0 | Status: DISCONTINUED | OUTPATIENT
Start: 2022-05-16 | End: 2022-05-18

## 2022-05-16 RX ORDER — OXYCODONE HYDROCHLORIDE 5 MG/1
5 TABLET ORAL
Refills: 0 | Status: COMPLETED | OUTPATIENT
Start: 2022-05-16 | End: 2022-05-23

## 2022-05-16 RX ORDER — ACETAMINOPHEN 500 MG
975 TABLET ORAL
Refills: 0 | Status: DISCONTINUED | OUTPATIENT
Start: 2022-05-16 | End: 2022-05-18

## 2022-05-16 RX ADMIN — Medication 30 MILLIGRAM(S): at 20:37

## 2022-05-16 RX ADMIN — Medication 0.25 MILLIGRAM(S): at 13:23

## 2022-05-16 RX ADMIN — SODIUM CHLORIDE 2000 MILLILITER(S): 9 INJECTION, SOLUTION INTRAVENOUS at 11:30

## 2022-05-16 RX ADMIN — Medication 30 MILLIGRAM(S): at 21:15

## 2022-05-16 RX ADMIN — HEPARIN SODIUM 5000 UNIT(S): 5000 INJECTION INTRAVENOUS; SUBCUTANEOUS at 20:36

## 2022-05-16 RX ADMIN — FAMOTIDINE 20 MILLIGRAM(S): 10 INJECTION INTRAVENOUS at 13:15

## 2022-05-16 RX ADMIN — Medication 15 MILLILITER(S): at 13:00

## 2022-05-16 RX ADMIN — Medication 975 MILLIGRAM(S): at 18:06

## 2022-05-16 RX ADMIN — CHLORHEXIDINE GLUCONATE 1 APPLICATION(S): 213 SOLUTION TOPICAL at 10:30

## 2022-05-16 NOTE — OB PROVIDER DELIVERY SUMMARY - NSSELHIDDEN_OBGYN_ALL_OB_FT
[NS_DeliveryAttending1_OBGYN_ALL_OB_FT:[email or web address]],[NS_DeliveryAssist1_OBGYN_ALL_OB_FT:[email or web address]=],[NS_DeliveryRN_OBGYN_ALL_OB_FT:[id number]]

## 2022-05-16 NOTE — DISCHARGE NOTE OB - MEDICATION SUMMARY - MEDICATIONS TO TAKE
I will START or STAY ON the medications listed below when I get home from the hospital:    acetaminophen 325 mg oral tablet  -- 3 tab(s) by mouth every 4 hours  -- Indication: For  delivery delivered    ibuprofen 600 mg oral tablet  -- 1 tab(s) by mouth every 6 hours  -- Indication: For  delivery delivered    oxyCODONE 5 mg oral tablet  -- 1 tab(s) by mouth once, As needed, Moderate to Severe Pain (4-10)  -- Indication: For  delivery delivered    oxyCODONE 5 mg oral tablet  -- 1 tab(s) by mouth once, As needed, Moderate to Severe Pain (4-10)  -- Indication: For  delivery delivered    oxyCODONE 5 mg oral tablet  -- 1 tab(s) by mouth every 3 hours, As needed, Moderate to Severe Pain (4-10)  -- Indication: For  delivery delivered    Prenatal 1 oral capsule  -- orally once a day  -- Indication: For  delivery delivered

## 2022-05-16 NOTE — DISCHARGE NOTE OB - CARE PLAN
1 Principal Discharge DX:	Delivery by  section for breech presentation  Assessment and plan of treatment:	PLease call if you have heavy vaginal bleeding, fever, pain uncontrolled with pain medicine, signs of wound infection.

## 2022-05-16 NOTE — DISCHARGE NOTE OB - PATIENT PORTAL LINK FT
You can access the FollowMyHealth Patient Portal offered by [facility] by registering at the following website: [email or web address]. By joining [employer]’s FollowMyHealth portal, you will also be able to view your health information using other applications (apps) compatible with our system.

## 2022-05-16 NOTE — OB PROVIDER H&P - HISTORY OF PRESENT ILLNESS
32y/o [id number] EDC 22 @ 39weeks for external cephalic version and if unsuccessful pltcs. Pt denies ctx/lof/vb. +FM  EFW 3200g GBS negative. Uncomplicated PNC    OBhx:   7#11, MAB x2 D&C  GynHx: denies  PMHx: denies  PSHx: denies  Meds: denies  All: NKDA  SH: neg x3

## 2022-05-16 NOTE — DISCHARGE NOTE OB - PLAN OF CARE
PLease call if you have heavy vaginal bleeding, fever, pain uncontrolled with pain medicine, signs of wound infection.

## 2022-05-16 NOTE — DISCHARGE NOTE OB - HOSPITAL COURSE
Patient admitted for delivery for breech presentation at 39 weeks. Attempted ECV done under CSE. Unsuccessful. PCS done for breech. Delivery and postpartum course uncomplicated.

## 2022-05-16 NOTE — DISCHARGE NOTE OB - CARE PROVIDER_API CALL
Maryjane Vasquez (DO)  [facility] [address], [address]  [place] [address]  Phone: [phone]  Fax: [phone]  Follow Up Time:

## 2022-05-16 NOTE — OB RN PATIENT PROFILE - NS_PARA_OBGYN_ALL_OB_NU
Price (Do Not Change): 0.00 Detail Level: Simple Instructions: This plan will send the code FBSE to the PM system.  DO NOT or CHANGE the price. 1

## 2022-05-16 NOTE — OB PROVIDER H&P - NS ATTEND AMEND GEN_ALL_CORE FT
Patient seen. for ECV attempt with epidural prior to CS. Consents signed for CS. Risks reviewed including bleeding, infection, damage to surrounding structures. all questions answered.     Maryjane Vasquez DO

## 2022-05-16 NOTE — OB PROVIDER DELIVERY SUMMARY - NSPROVIDERDELIVERYNOTE_OBGYN_ALL_OB_FT
primary LTCS for breech presentation s/p unsuccessful ECV, uncomplicated  viable female infant, breech presentation, 3220g, Apgars 9/9, cord gasses sent  Grossly normal fallopian tubes, uterus, and ovaries  Hysterotomy closed in 2 layers    QBL: 289  EBL:750  IVF: 1500  UOP: 225    Scooter PGY2 primary LTCS for breech presentation s/p unsuccessful ECV, uncomplicated  viable female infant, breech presentation, 3220g, Apgars 9/9, cord gasses sent. shorter umbilical cord  Grossly normal fallopian tubes, uterus, and ovaries  Hysterotomy closed in 2 layers    QBL: 289  EBL:750  IVF: 1500  UOP: 225    Scooter PGY2 primary LTCS for breech presentation s/p unsuccessful ECV, uncomplicated  viable female infant, breech presentation, 3220g, Apgars 9/9, cord gasses sent. shorter umbilical cord  Grossly normal fallopian tubes, uterus, and ovaries  Hysterotomy closed in 2 layers  Surgicel powder used over hysterotomy    QBL: 289  EBL:750  IVF: 1500  UOP: 225    Scooter PGY2

## 2022-05-16 NOTE — OB RN DELIVERY SUMMARY - NSSELHIDDEN_OBGYN_ALL_OB_FT
[NS_DeliveryAttending1_OBGYN_ALL_OB_FT:[email or web address]=],[NS_DeliveryAssist1_OBGYN_ALL_OB_FT:[email or web address]=],[NS_DeliveryRN_OBGYN_ALL_OB_FT:[id number]]

## 2022-05-16 NOTE — DISCHARGE NOTE OB - NS MD DC FALL RISK RISK
For information on Fall & Injury Prevention, visit: [email or web address] OR  [email or web address] OR  https://www.cdc.gov/steadi/patient.html

## 2022-05-16 NOTE — OB PROVIDER H&P - NSHPPHYSICALEXAM_GEN_ALL_CORE
T(C): --  HR: 85 (05-16-22 @ 10:56) (81 - 85)  BP: 120/82 (05-16-22 @ 10:39) (120/82 - 120/82)  RR: --  SpO2: 100% (05-16-22 @ 10:56) (99% - 100%)  GEN:NAD  CV:RRR  Pulm: CTA bl  Abd soft NT gravid  FHT:  150, mod garth, + accels, - decels   TOCO:  irregular  VE: deferred  SONO breech

## 2022-05-16 NOTE — OB PROVIDER H&P - ASSESSMENT
30yo  @ 39weeks, breech presentation, for external cephalic version, with epidural. If the version cannot be accomplished, pt is for pltcs.  admit  NPO  routine labs  COVID negative  anesthesia consult  pltcs v IOL  Lisa Vasquez and Finesse Austin PAC

## 2022-05-16 NOTE — DISCHARGE NOTE OB - MATERIALS PROVIDED
Vaccinations/Breastfeeding Log/Guide to Postpartum Care/[facility] Hearing Screen Program/Back To Sleep Handout/Shaken Baby Prevention Handout/Breastfeeding Guide and Packet/Birth Certificate Instructions

## 2022-05-16 NOTE — OB RN INTRAOPERATIVE NOTE - NSSELHIDDEN_OBGYN_ALL_OB_FT
[NS_DeliveryAttending1_OBGYN_ALL_OB_FT:[email or web address]=],[NS_DeliveryAssist1_OBGYN_ALL_OB_FT:[email or web address]=],[NS_DeliveryRN_OBGYN_ALL_OB_FT:[id number]==]

## 2022-05-17 LAB
BASOPHILS # BLD AUTO: 0.07 K/UL — SIGNIFICANT CHANGE UP (ref 0–0.2)
BASOPHILS NFR BLD AUTO: 0.4 % — SIGNIFICANT CHANGE UP (ref 0–2)
EOSINOPHIL # BLD AUTO: 0.03 K/UL — SIGNIFICANT CHANGE UP (ref 0–0.5)
EOSINOPHIL NFR BLD AUTO: 0.2 % — SIGNIFICANT CHANGE UP (ref 0–6)
HCT VFR BLD CALC: 31.2 % — LOW (ref 34.5–45)
HGB BLD-MCNC: 10.1 G/DL — LOW (ref 11.5–15.5)
IMM GRANULOCYTES NFR BLD AUTO: 0.9 % — SIGNIFICANT CHANGE UP (ref 0–1.5)
LYMPHOCYTES # BLD AUTO: 14.9 % — SIGNIFICANT CHANGE UP (ref 13–44)
LYMPHOCYTES # BLD AUTO: 2.6 K/UL — SIGNIFICANT CHANGE UP (ref 1–3.3)
MCHC RBC-ENTMCNC: 27.7 PG — SIGNIFICANT CHANGE UP (ref 27–34)
MCHC RBC-ENTMCNC: 32.4 GM/DL — SIGNIFICANT CHANGE UP (ref 32–36)
MCV RBC AUTO: 85.5 FL — SIGNIFICANT CHANGE UP (ref 80–100)
MONOCYTES # BLD AUTO: 1.11 K/UL — HIGH (ref 0–0.9)
MONOCYTES NFR BLD AUTO: 6.4 % — SIGNIFICANT CHANGE UP (ref 2–14)
NEUTROPHILS # BLD AUTO: 13.47 K/UL — HIGH (ref 1.8–7.4)
NEUTROPHILS NFR BLD AUTO: 77.2 % — HIGH (ref 43–77)
NRBC # BLD: 0 /100 WBCS — SIGNIFICANT CHANGE UP (ref 0–0)
PLATELET # BLD AUTO: 283 K/UL — SIGNIFICANT CHANGE UP (ref 150–400)
RBC # BLD: 3.65 M/UL — LOW (ref 3.8–5.2)
RBC # FLD: 12.7 % — SIGNIFICANT CHANGE UP (ref 10.3–14.5)
WBC # BLD: 17.43 K/UL — HIGH (ref 3.8–10.5)
WBC # FLD AUTO: 17.43 K/UL — HIGH (ref 3.8–10.5)

## 2022-05-17 RX ORDER — OXYCODONE HYDROCHLORIDE 5 MG/1
5 TABLET ORAL
Refills: 0 | Status: DISCONTINUED | OUTPATIENT
Start: 2022-05-17 | End: 2022-05-18

## 2022-05-17 RX ORDER — OXYCODONE HYDROCHLORIDE 5 MG/1
5 TABLET ORAL ONCE
Refills: 0 | Status: DISCONTINUED | OUTPATIENT
Start: 2022-05-17 | End: 2022-05-18

## 2022-05-17 RX ORDER — IBUPROFEN 200 MG
600 TABLET ORAL EVERY 6 HOURS
Refills: 0 | Status: DISCONTINUED | OUTPATIENT
Start: 2022-05-17 | End: 2022-05-18

## 2022-05-17 RX ADMIN — Medication 975 MILLIGRAM(S): at 18:37

## 2022-05-17 RX ADMIN — Medication 30 MILLIGRAM(S): at 15:37

## 2022-05-17 RX ADMIN — HEPARIN SODIUM 5000 UNIT(S): 5000 INJECTION INTRAVENOUS; SUBCUTANEOUS at 09:29

## 2022-05-17 RX ADMIN — Medication 975 MILLIGRAM(S): at 00:09

## 2022-05-17 RX ADMIN — Medication 30 MILLIGRAM(S): at 09:29

## 2022-05-17 RX ADMIN — OXYCODONE HYDROCHLORIDE 5 MILLIGRAM(S): 5 TABLET ORAL at 20:59

## 2022-05-17 RX ADMIN — Medication 975 MILLIGRAM(S): at 12:05

## 2022-05-17 RX ADMIN — Medication 975 MILLIGRAM(S): at 06:19

## 2022-05-17 RX ADMIN — OXYCODONE HYDROCHLORIDE 5 MILLIGRAM(S): 5 TABLET ORAL at 21:45

## 2022-05-17 RX ADMIN — Medication 975 MILLIGRAM(S): at 12:35

## 2022-05-17 RX ADMIN — Medication 30 MILLIGRAM(S): at 15:07

## 2022-05-17 RX ADMIN — HEPARIN SODIUM 5000 UNIT(S): 5000 INJECTION INTRAVENOUS; SUBCUTANEOUS at 20:59

## 2022-05-17 RX ADMIN — Medication 600 MILLIGRAM(S): at 21:45

## 2022-05-17 RX ADMIN — Medication 975 MILLIGRAM(S): at 18:07

## 2022-05-17 RX ADMIN — Medication 600 MILLIGRAM(S): at 20:59

## 2022-05-17 RX ADMIN — Medication 30 MILLIGRAM(S): at 02:56

## 2022-05-17 RX ADMIN — Medication 30 MILLIGRAM(S): at 10:00

## 2022-05-17 RX ADMIN — Medication 30 MILLIGRAM(S): at 03:30

## 2022-05-17 NOTE — PROGRESS NOTE ADULT - ASSESSMENT
A/P:  31y  P2 S/P primary   section   POD # 1, doing well      PMHx:  PAST MEDICAL & SURGICAL HISTORY:  No pertinent past medical history      S/P breast augmentation  -      [place] teeth extracted      H/O dilation and curettage  -for missed AB 2019

## 2022-05-17 NOTE — PROGRESS NOTE ADULT - PROBLEM SELECTOR PLAN 1
31y  P2 S/P primary  section for breech presentation  POD # 1, doing well    Current Issues: None    Increase OOB  DVT ppx  Regular diet  AM CBC  Routine Postpartum/Post-op care    Mima Jenkins FNP-BC

## 2022-05-18 VITALS — DIASTOLIC BLOOD PRESSURE: 79 MMHG | SYSTOLIC BLOOD PRESSURE: 114 MMHG

## 2022-05-18 PROCEDURE — 59050 FETAL MONITOR W/REPORT: CPT

## 2022-05-18 PROCEDURE — 86901 BLOOD TYPING SEROLOGIC RH(D): CPT

## 2022-05-18 PROCEDURE — 36415 COLL VENOUS BLD VENIPUNCTURE: CPT

## 2022-05-18 PROCEDURE — 86850 RBC ANTIBODY SCREEN: CPT

## 2022-05-18 PROCEDURE — 85025 COMPLETE CBC W/AUTO DIFF WBC: CPT

## 2022-05-18 PROCEDURE — 59412 ANTEPARTUM MANIPULATION: CPT

## 2022-05-18 PROCEDURE — 59025 FETAL NON-STRESS TEST: CPT

## 2022-05-18 PROCEDURE — 76818 FETAL BIOPHYS PROFILE W/NST: CPT

## 2022-05-18 PROCEDURE — 86900 BLOOD TYPING SEROLOGIC ABO: CPT

## 2022-05-18 PROCEDURE — 86780 TREPONEMA PALLIDUM: CPT

## 2022-05-18 PROCEDURE — 86769 SARS-COV-2 COVID-19 ANTIBODY: CPT

## 2022-05-18 PROCEDURE — C1889: CPT

## 2022-05-18 RX ORDER — OXYCODONE HYDROCHLORIDE 5 MG/1
1 TABLET ORAL
Qty: 0 | Refills: 0 | DISCHARGE
Start: 2022-05-18

## 2022-05-18 RX ORDER — ACETAMINOPHEN 500 MG
3 TABLET ORAL
Qty: 0 | Refills: 0 | DISCHARGE
Start: 2022-05-18

## 2022-05-18 RX ORDER — IBUPROFEN 200 MG
1 TABLET ORAL
Qty: 0 | Refills: 0 | DISCHARGE
Start: 2022-05-18

## 2022-05-18 RX ADMIN — Medication 975 MILLIGRAM(S): at 12:06

## 2022-05-18 RX ADMIN — OXYCODONE HYDROCHLORIDE 5 MILLIGRAM(S): 5 TABLET ORAL at 12:34

## 2022-05-18 RX ADMIN — Medication 975 MILLIGRAM(S): at 12:34

## 2022-05-18 RX ADMIN — Medication 600 MILLIGRAM(S): at 03:14

## 2022-05-18 RX ADMIN — Medication 975 MILLIGRAM(S): at 06:07

## 2022-05-18 RX ADMIN — OXYCODONE HYDROCHLORIDE 5 MILLIGRAM(S): 5 TABLET ORAL at 12:06

## 2022-05-18 RX ADMIN — HEPARIN SODIUM 5000 UNIT(S): 5000 INJECTION INTRAVENOUS; SUBCUTANEOUS at 08:50

## 2022-05-18 RX ADMIN — Medication 600 MILLIGRAM(S): at 10:00

## 2022-05-18 RX ADMIN — Medication 600 MILLIGRAM(S): at 08:49

## 2022-05-18 RX ADMIN — OXYCODONE HYDROCHLORIDE 5 MILLIGRAM(S): 5 TABLET ORAL at 06:08

## 2022-05-18 RX ADMIN — Medication 975 MILLIGRAM(S): at 00:45

## 2022-05-18 RX ADMIN — OXYCODONE HYDROCHLORIDE 5 MILLIGRAM(S): 5 TABLET ORAL at 10:00

## 2022-05-18 RX ADMIN — Medication 975 MILLIGRAM(S): at 06:39

## 2022-05-18 RX ADMIN — Medication 600 MILLIGRAM(S): at 04:00

## 2022-05-18 RX ADMIN — OXYCODONE HYDROCHLORIDE 5 MILLIGRAM(S): 5 TABLET ORAL at 06:39

## 2022-05-18 RX ADMIN — Medication 975 MILLIGRAM(S): at 00:07

## 2022-05-18 RX ADMIN — OXYCODONE HYDROCHLORIDE 5 MILLIGRAM(S): 5 TABLET ORAL at 08:50

## 2022-05-18 NOTE — PROGRESS NOTE ADULT - ASSESSMENT
A/P:  31y  P2 S/P primary   section   POD # 2, doing well      PMHx:  PAST MEDICAL & SURGICAL HISTORY:  No pertinent past medical history      S/P breast augmentation  -      [place] teeth extracted      H/O dilation and curettage  -for missed AB 2019

## 2022-05-18 NOTE — PROGRESS NOTE ADULT - SUBJECTIVE AND OBJECTIVE BOX
Day 1 of Anesthesia Pain Management Service    SUBJECTIVE:  Pain Scale Score:          [X] Refer to charted pain scores    THERAPY: Received PF neuraxial morphine as per pain service nurse's note    OBJECTIVE:    Sedation:        	[X] Alert	[ ] Drowsy	[ ] Arousable      [ ] Asleep       [ ] Unresponsive    Side Effects:	[X] None	[ ] Nausea	[ ] Vomiting         [ ] Pruritus  		[ ] Weakness            [ ] Numbness	          [ ] Other:    ASSESSMENT/ PLAN  [X] Patient transitioned to prn analgesics  [X] Pain management per primary service, pain service to sign off   [X]Documentation and Verification of current medications
Day 1 of Anesthesia Pain Management Service    SUBJECTIVE: Doing ok  Pain Scale Score:          [X] Refer to charted pain scores    THERAPY:  s/p   2  mg PF epidural morphine on 5\16\2022      MEDICATIONS  (STANDING):  acetaminophen     Tablet .. 975 milliGRAM(s) Oral <User Schedule>  ceFAZolin   IVPB 2000 milliGRAM(s) IV Intermittent once  diphtheria/tetanus/pertussis (acellular) Vaccine (ADAcel) 0.5 milliLiter(s) IntraMuscular once  heparin   Injectable 5000 Unit(s) SubCutaneous every 12 hours  ibuprofen  Tablet. 600 milliGRAM(s) Oral every 6 hours  ketorolac   Injectable 30 milliGRAM(s) IV Push every 6 hours  morphine PF Epidural 2 milliGRAM(s) Epidural once  oxytocin Infusion 333.333 milliUNIT(s)/Min (1000 mL/Hr) IV Continuous <Continuous>    MEDICATIONS  (PRN):  diphenhydrAMINE 25 milliGRAM(s) Oral every 6 hours PRN Pruritus  lanolin Ointment 1 Application(s) Topical every 6 hours PRN Sore Nipples  magnesium hydroxide Suspension 30 milliLiter(s) Oral two times a day PRN Constipation  oxyCODONE    IR 5 milliGRAM(s) Oral once PRN Moderate to Severe Pain (4-10)  oxyCODONE    IR 5 milliGRAM(s) Oral every 3 hours PRN Moderate to Severe Pain (4-10)  oxyCODONE    IR 5 milliGRAM(s) Oral once PRN Moderate to Severe Pain (4-10)  simethicone 80 milliGRAM(s) Chew every 4 hours PRN Gas      OBJECTIVE:    Sedation:        	[X] Alert	 [ ] Drowsy	[ ] Arousable      [ ] Asleep       [ ] Unresponsive    Side Effects:	[ ] None 	[ ] Nausea	[ ] Vomiting         [ X] Pruritus  		[ ] Weakness            [ ] Numbness	          [ ] Other:    Vital Signs Last 24 Hrs  T(C): 37.2 (17 May 2022 05:00), Max: 37.6 (16 May 2022 21:33)  T(F): 98.9 (17 May 2022 05:00), Max: 99.7 (16 May 2022 21:33)  HR: 75 (17 May 2022 05:00) (70 - 146)  BP: 104/63 (17 May 2022 05:00) (92/51 - 145/71)  BP(mean): 84 (16 May 2022 16:50) (69 - 84)  RR: 17 (17 May 2022 05:00) (15 - 24)  SpO2: 96% (17 May 2022 05:00) (89% - 100%)    ASSESSMENT/ PLAN  [X] Patient to be transitioned to prn analgesics after 24 hours   [X] Pain management per primary service, pain service to sign off   [X]Documentation and Verification of current medications
Postpartum Note,  Section   ATTENDING NOTE - Post-operative day 1    Subjective:  The patient feels well. Ambulating without difficulty  Pt is tolerating regular diet. Pain well controlled.  She denies nausea and vomiting.    (   )Powers dc'd   awaiting spont void     (   ) Powers still in place    ( x ) powers dc'd pt already voided  (  ) breastfeeding    She reports normal postpartum bleeding    Physical exam:    Vital Signs Last 24 Hrs  T(C): 37.2 (17 May 2022 05:00), Max: 37.6 (16 May 2022 21:33)  T(F): 98.9 (17 May 2022 05:00), Max: 99.7 (16 May 2022 21:33)  HR: 75 (17 May 2022 05:00) (70 - 146)  BP: 104/63 (17 May 2022 05:00) (92/51 - 145/71)  BP(mean): 84 (16 May 2022 16:50) (69 - 84)  RR: 17 (17 May 2022 05:00) (15 - 24)  SpO2: 96% (17 May 2022 05:00) (89% - 100%)    Gen: NAD  Breast: Soft, nontender, not engorged.  Abdomen: Soft, nontender, no distension , firm uterine fundus at umbilicus -2.  Incision: Clean, dry, and intact with steris  Ext: No calf tenderness, no hyper reflexia, (  )trace (  )1+  edema      LABS:                        10.1   17.43 )-----------( 283      ( 17 May 2022 07:00 )             31.2     ABO Interpretation: AB (22 @ 12:07)  Rh Interpretation: Positive (22 @ 12:07)    Antibody ScreenNegative                Allergies    No Known Allergies    Intolerances      MEDICATIONS  (STANDING):  acetaminophen     Tablet .. 975 milliGRAM(s) Oral <User Schedule>  ceFAZolin   IVPB 2000 milliGRAM(s) IV Intermittent once  diphtheria/tetanus/pertussis (acellular) Vaccine (ADAcel) 0.5 milliLiter(s) IntraMuscular once  heparin   Injectable 5000 Unit(s) SubCutaneous every 12 hours  ibuprofen  Tablet. 600 milliGRAM(s) Oral every 6 hours  ketorolac   Injectable 30 milliGRAM(s) IV Push every 6 hours  morphine PF Epidural 2 milliGRAM(s) Epidural once  oxytocin Infusion 333.333 milliUNIT(s)/Min (1000 mL/Hr) IV Continuous <Continuous>    MEDICATIONS  (PRN):  diphenhydrAMINE 25 milliGRAM(s) Oral every 6 hours PRN Pruritus  lanolin Ointment 1 Application(s) Topical every 6 hours PRN Sore Nipples  magnesium hydroxide Suspension 30 milliLiter(s) Oral two times a day PRN Constipation  oxyCODONE    IR 5 milliGRAM(s) Oral once PRN Moderate to Severe Pain (4-10)  oxyCODONE    IR 5 milliGRAM(s) Oral every 3 hours PRN Moderate to Severe Pain (4-10)  oxyCODONE    IR 5 milliGRAM(s) Oral once PRN Moderate to Severe Pain (4-10)  simethicone 80 milliGRAM(s) Chew every 4 hours PRN Gas        Assessment and Plan  POD # 1  s/p  section. Stable.  Encourage ambulation  Continue with PCEA/PCA  Regular diet as tolerated  Follow up [facility]    Office [phone]  Dr. Turner                  
Postpartum Note-  Section POD#1    Allergies    No Known Allergies    Blood Type AB Positive     RPR Negative    S: Patient is a  31y P2  POD#1 S/P  primary c/s for breech presentation  Patient w/o complaints, pain is controlled.  Pt is OOB, tolerating PO, passing flatus. Lochia WNL.    Feeding: Bottle    Gen: NAD  Abdomen: Soft, nontender, non-distended, fundus firm.  Incision: Clean, dry, and intact.  Negative erythema/edema/ecchymosis   Sub Q  Lochia WNL  Ext: Negative Edema            
Postpartum Note-  Section POD#2    Allergies    No Known Allergies    Blood Type AB Positive     RPR Negative    S: Patient is a  31y P2  POD#2 S/P  primary c/s for breech presentation  Patient w/o complaints, pain is controlled.  Pt is OOB, tolerating PO, passing flatus. Lochia WNL.    Feeding: Bottle    Gen: NAD  Abdomen: Soft, nontender, non-distended, fundus firm.  Incision: Clean, dry, and intact.  Negative erythema/edema/ecchymosis   Sub Q  Lochia WNL  Ext: Negative Edema

## 2022-05-18 NOTE — PROGRESS NOTE ADULT - ATTENDING COMMENTS
DOING WELL. SITTING UP IN CHAIR. ABD DISCOMFORT BUT APPROPRIATE. WOULD LIKE TO GO HOME.  VSS  INCISION C/D/I  D/C HOME  INSTR GIVEN  POSTOP 2 WEEKS    JOSELINE GONSALEZ

## 2022-05-18 NOTE — PROGRESS NOTE ADULT - PROBLEM SELECTOR PLAN 1
31y  P2 S/P primary  section for breech presentation  POD # 2, doing well    Current Issues: None    Increase OOB  DVT ppx  Regular diet  Routine Postpartum/Post-op care    Mima Jenkins FNP-BC

## 2022-05-25 NOTE — OB RN INTRAOPERATIVE NOTE - NS_IMPLANTS_ADDITIONAL IMPLANTS LOT NUMBER_OBGYN_ALL_OB_FT
SDBAHZ Tranexamic Acid Counseling:  Patient advised of the small risk of bleeding problems with tranexamic acid. They were also instructed to call if they developed any nausea, vomiting or diarrhea. All of the patient's questions and concerns were addressed.

## 2022-06-21 ENCOUNTER — RESULT REVIEW (OUTPATIENT)
Age: 32
End: 2022-06-21

## 2023-04-17 NOTE — OB RN DELIVERY SUMMARY - NS_NEWBORNAMRN_OBGYN_ALL_OB_FT
Dr. Moreland's office will contact you with the results of your sleep study within a week.  If you don't hear from Dr. Moreland's staff please contact them at [phone].    [phone]

## 2023-09-27 ENCOUNTER — APPOINTMENT (OUTPATIENT)
Dept: OBGYN | Facility: CLINIC | Age: 33
End: 2023-09-27
Payer: COMMERCIAL

## 2023-09-27 VITALS
DIASTOLIC BLOOD PRESSURE: 68 MMHG | SYSTOLIC BLOOD PRESSURE: 110 MMHG | BODY MASS INDEX: 25.99 KG/M2 | HEIGHT: 65 IN | WEIGHT: 156 LBS

## 2023-09-27 DIAGNOSIS — Z01.419 ENCOUNTER FOR GYNECOLOGICAL EXAMINATION (GENERAL) (ROUTINE) W/OUT ABNORMAL FINDINGS: ICD-10-CM

## 2023-09-27 DIAGNOSIS — Z30.41 ENCOUNTER FOR SURVEILLANCE OF CONTRACEPTIVE PILLS: ICD-10-CM

## 2023-09-27 PROCEDURE — 99395 PREV VISIT EST AGE 18-39: CPT

## 2023-09-27 RX ORDER — NORETHINDRONE ACETATE AND ETHINYL ESTRADIOL, ETHINYL ESTRADIOL AND FERROUS FUMARATE 1MG-10(24)
KIT ORAL
Refills: 0 | Status: ACTIVE | COMMUNITY

## 2023-09-27 RX ORDER — METFORMIN HYDROCHLORIDE 1000 MG/1
1000 TABLET, FILM COATED, EXTENDED RELEASE ORAL
Refills: 0 | Status: DISCONTINUED | COMMUNITY
End: 2023-09-27

## 2023-09-28 LAB
C TRACH RRNA SPEC QL NAA+PROBE: NOT DETECTED
HPV HIGH+LOW RISK DNA PNL CVX: NOT DETECTED
N GONORRHOEA RRNA SPEC QL NAA+PROBE: NOT DETECTED
SOURCE TP AMPLIFICATION: NORMAL

## 2023-09-29 LAB — CYTOLOGY CVX/VAG DOC THIN PREP: ABNORMAL

## 2023-11-01 NOTE — PROGRESS NOTE ADULT - ASSESSMENT
Thank you letting us take care of you today. We hope that all your questions were addressed. If a question was overlooked or something else comes to mind after you return home, please call our office at [phone]. If you need to cancel or change an appointment, surgery or procedure, please contact the office at [phone]. A: 29y PPD#2 s/p  doing well.    Plan: cont PP care  OOB/ambulation  Pain control  Discharge home. Discharge instructions reviewed with the patient. Will follow up in 4-6 weeks    Maryjane Vasquez DO

## 2023-11-22 ENCOUNTER — RESULT REVIEW (OUTPATIENT)
Age: 33
End: 2023-11-22

## 2023-11-22 ENCOUNTER — OUTPATIENT (OUTPATIENT)
Dept: OUTPATIENT SERVICES | Facility: HOSPITAL | Age: 33
LOS: 1 days | End: 2023-11-22
Payer: COMMERCIAL

## 2023-11-22 ENCOUNTER — APPOINTMENT (OUTPATIENT)
Dept: MAMMOGRAPHY | Facility: CLINIC | Age: 33
End: 2023-11-22
Payer: COMMERCIAL

## 2023-11-22 DIAGNOSIS — K08.409 PARTIAL LOSS OF TEETH, UNSPECIFIED CAUSE, UNSPECIFIED CLASS: Chronic | ICD-10-CM

## 2023-11-22 DIAGNOSIS — Z98.890 OTHER SPECIFIED POSTPROCEDURAL STATES: Chronic | ICD-10-CM

## 2023-11-22 DIAGNOSIS — Z01.419 ENCOUNTER FOR GYNECOLOGICAL EXAMINATION (GENERAL) (ROUTINE) WITHOUT ABNORMAL FINDINGS: ICD-10-CM

## 2023-11-22 DIAGNOSIS — Z98.82 BREAST IMPLANT STATUS: Chronic | ICD-10-CM

## 2023-11-22 PROCEDURE — 77067 SCR MAMMO BI INCL CAD: CPT | Mod: 26

## 2023-11-22 PROCEDURE — 77063 BREAST TOMOSYNTHESIS BI: CPT | Mod: 26

## 2023-11-22 PROCEDURE — 77067 SCR MAMMO BI INCL CAD: CPT

## 2023-11-22 PROCEDURE — 77063 BREAST TOMOSYNTHESIS BI: CPT

## 2023-12-27 NOTE — OB PST NOTE - PROBLEM SELECTOR PLAN 2
----- Message from Aye Armstrong sent at 2023  9:16 AM CST -----  Contact: SELF  Alejo Melvin  MRN: [id number]  : 1947  PCP: Leroy Alston  Home Phone      [phone]  Work Phone      Not on file.  Mobile          [phone]      MESSAGE:   PATIENT IS STATING TO HAVE MOBILITY ISSUES, IS A MUSCLE PAIN WHEN HE WALKS, PLEASE ADVISE        [phone]     The Caprini score indicates this patient is at risk for a VTE event (score 3-5).  Most surgical patients in this group would benefit from pharmacologic prophylaxis.  The surgical team will determine the balance between VTE risk and bleeding risk

## 2024-01-12 RX ORDER — NORETHINDRONE ACETATE AND ETHINYL ESTRADIOL AND FERROUS FUMARATE 1MG-20(21)
1-20 KIT ORAL DAILY
Qty: 90 | Refills: 1 | Status: DISCONTINUED | COMMUNITY
Start: 2023-09-27 | End: 2024-01-12

## 2024-01-12 RX ORDER — NORETHINDRONE ACETATE AND ETHINYL ESTRADIOL 1MG-20(21)
1-20 KIT ORAL
Qty: 3 | Refills: 2 | Status: ACTIVE | COMMUNITY
Start: 2024-01-12 | End: 1900-01-01

## 2024-04-17 ENCOUNTER — NON-APPOINTMENT (OUTPATIENT)
Age: 34
End: 2024-04-17

## 2024-04-17 ENCOUNTER — APPOINTMENT (OUTPATIENT)
Dept: DERMATOLOGY | Facility: CLINIC | Age: 34
End: 2024-04-17
Payer: COMMERCIAL

## 2024-04-17 DIAGNOSIS — L29.9 PRURITUS, UNSPECIFIED: ICD-10-CM

## 2024-04-17 DIAGNOSIS — L81.4 OTHER MELANIN HYPERPIGMENTATION: ICD-10-CM

## 2024-04-17 DIAGNOSIS — D22.9 MELANOCYTIC NEVI, UNSPECIFIED: ICD-10-CM

## 2024-04-17 DIAGNOSIS — D18.01 HEMANGIOMA OF SKIN AND SUBCUTANEOUS TISSUE: ICD-10-CM

## 2024-04-17 PROCEDURE — 99204 OFFICE O/P NEW MOD 45 MIN: CPT

## 2024-04-17 RX ORDER — MOMETASONE FUROATE 1 MG/ML
0.1 SOLUTION TOPICAL
Qty: 1 | Refills: 2 | Status: ACTIVE | COMMUNITY
Start: 2024-04-17 | End: 1900-01-01

## 2024-04-17 NOTE — HISTORY OF PRESENT ILLNESS
[FreeTextEntry1] : FBSE [de-identified] : Patient presents for skin check; No itching, bleeding, growing, changing lesions noted. No personal or family hx of skin cancer. Patient also c/o itching of full body. She sometimes gets hives when she scratches.

## 2024-04-17 NOTE — ASSESSMENT
[FreeTextEntry1] : A complete skin examination was performed.  There is no evidence of skin cancer.  We discussed the importance of photoprotection, including the use of hats, protective clothing and sunscreens with an SPF of at least 30.  Sun avoidance was also discussed.  The ABCDE's of melanoma were discussed.  Regular skin exams recommended.   Pruritus: Possible allergic component  OTC antihistamine daily Mometasone solution to scalp BID x 1 week then QD x 2 weeks then prn flares  Discussed risks and benefits of topical steroids, including atrophy, telangiectasias, striae, acneiform eruption, purpura, changes in pigmentation, and hypertrichosis.  Recommended f/u with allergist (d/c antihistamine 1 week before) for allergy testing  Possible stress/neurogenic component

## 2024-04-17 NOTE — PHYSICAL EXAM
[Alert] : alert [Oriented x 3] : ~L oriented x 3 [Well Nourished] : well nourished [Conjunctiva Non-injected] : conjunctiva non-injected [No Visual Lymphadenopathy] : no visual  lymphadenopathy [No Clubbing] : no clubbing [No Edema] : no edema [No Bromhidrosis] : no bromhidrosis [No Chromhidrosis] : no chromhidrosis [FreeTextEntry3] : A full skin exam was performed including the scalp, face (including the lips, ears, nose and eyes), neck, chest, breasts, abdomen, back, buttocks, upper extremities and lower extremities.  The patient declined examination of the genitalia.   The exam revealed the following benign growths:  Pigmented nevi.   Lentigines.  Cherry angiomas.

## 2024-04-18 NOTE — OB PST NOTE - NS_SPONTANEOUSVAG_OBGYN_ALL_OB_NU
Group Topic:  Substance Abuse Process Group    Date: 4/18/2024  Start Time:  9:00 AM  End Time:  2:30 PM  Facilitator(s): REN Roman MBA, GARRETTW, [facility]  Method: Group  Participation: Active  Patient report of any safety concerns: No  Physical concerns reported: No  Drugs/alcohol reported since last session: No  Mood: Appropriate  Affect: Normal  Thought Process: Congruent  Perceptual Problems: None  Speech: Clear/articulate  Behavior/Socialization: Appropriate to Group  Task Performance: Follows directions.  Patient Response: Attentive and Appropriate feedback    Special Note: [facility] program also provides supplemental education on anxiety and depression and its impact on substance use, thoughts, activities, people interactions and mood.    Ancillary Content: None    Group Type: Process Group and Stages of Change with case scenario  Seeking Safety Content: N/A  Seeking Safety Content: N/A  Group Handouts: None  Start Time: 0900  End Time:  1100  Attendance: Billable. 45 minutes or more    Number of group participants: 8   Individual Response to Group: Patient talked about recent life situations, activities and stressors. Coping fair, thoughts logical, no SI/HI. Then, inclusion of the stages of change case scenarios with education about stages of change and facilitator led case scenarios with client involvement.     Group Type: LEANNA Activity  Seeking Safety Content: N/A  Seeking Safety Content: N/A  Group Handouts: None  Start Time: 1300  End Time:  1430  Attendance: Not Present  Number of group participants: 6  Individual Response to Group: Continuation of Stages of change with discussion about stages of change, maslow hierarchy of needs and sub/unconscious thoughts.  Patient NOT present during afternoon groups.    GAD7 Score: Non-scoring day   PHQ9 Score: Non-scoring day     Patient warrants continued treatment at the [facility] level of care to continue to monitor symptoms and treatment outcomes related to unchanged  treatment plan and identify effective coping techniques to manage symptoms and treatment outcomes accordingly. Patient's overall functioning is still impacted by current mental health/LEANNA challenges as evidenced by unchanged treatment plan.    Abel Loredo, MARQUEZ, [facility]     1

## 2024-05-07 ENCOUNTER — APPOINTMENT (OUTPATIENT)
Dept: NEUROLOGY | Facility: CLINIC | Age: 34
End: 2024-05-07

## 2024-10-02 ENCOUNTER — APPOINTMENT (OUTPATIENT)
Dept: OBGYN | Facility: CLINIC | Age: 34
End: 2024-10-02
Payer: COMMERCIAL

## 2024-10-02 VITALS
SYSTOLIC BLOOD PRESSURE: 118 MMHG | HEIGHT: 65 IN | BODY MASS INDEX: 25.83 KG/M2 | DIASTOLIC BLOOD PRESSURE: 74 MMHG | WEIGHT: 155 LBS

## 2024-10-02 DIAGNOSIS — Z01.419 ENCOUNTER FOR GYNECOLOGICAL EXAMINATION (GENERAL) (ROUTINE) W/OUT ABNORMAL FINDINGS: ICD-10-CM

## 2024-10-02 DIAGNOSIS — Z30.41 ENCOUNTER FOR SURVEILLANCE OF CONTRACEPTIVE PILLS: ICD-10-CM

## 2024-10-02 LAB
CARD LOT #: NORMAL
CARD LOT EXP DATE: NORMAL
DATE COLLECTED: NORMAL
DATE COLLECTED: NORMAL
DEVELOPER LOT #: NORMAL
DEVELOPER LOT EXP DATE: NORMAL
HEMOCCULT 2: NEGATIVE
HEMOCCULT SP1 STL QL: NEGATIVE
QUALITY CONTROL: YES
QUALITY CONTROL: YES

## 2024-10-02 PROCEDURE — 99395 PREV VISIT EST AGE 18-39: CPT

## 2024-10-02 PROCEDURE — 82270 OCCULT BLOOD FECES: CPT

## 2024-10-02 RX ORDER — NORETHINDRONE ACETATE AND ETHINYL ESTRADIOL TABLETS AND FERROUS FUMARATE TABLETS 1MG-20(24)
1-20 KIT ORAL
Qty: 3 | Refills: 1 | Status: ACTIVE | COMMUNITY
Start: 2024-10-02 | End: 1900-01-01

## 2024-10-02 NOTE — HISTORY OF PRESENT ILLNESS
[FreeTextEntry1] : Patient is a 33-year-old female who presents for routine annual gynecologic examination and oral contraceptive pill surveillance and maintenance visit.  Patient is on norethindrone ethinyl estradiol FE 1/20 and states she is doing well and would like to continue this prescription.  Patient's last mammogram was November 2023.

## 2024-10-02 NOTE — DISCUSSION/SUMMARY
[FreeTextEntry1] : Impression: Normal GYN exam, uses oral contraceptives  Continue norethindrone ethinyl estradiol FE 1/20  Recommendations: Self breast exam, vitamin supplementation regular exercise  Follow-up in 6 months

## 2024-10-09 LAB
CYTOLOGY CVX/VAG DOC THIN PREP: NORMAL
HPV HIGH+LOW RISK DNA PNL CVX: NOT DETECTED

## 2024-11-08 DIAGNOSIS — Z12.39 ENCOUNTER FOR OTHER SCREENING FOR MALIGNANT NEOPLASM OF BREAST: ICD-10-CM

## 2024-12-09 ENCOUNTER — RESULT REVIEW (OUTPATIENT)
Age: 34
End: 2024-12-09

## 2024-12-09 ENCOUNTER — APPOINTMENT (OUTPATIENT)
Dept: MAMMOGRAPHY | Facility: CLINIC | Age: 34
End: 2024-12-09
Payer: COMMERCIAL

## 2024-12-09 ENCOUNTER — OUTPATIENT (OUTPATIENT)
Dept: OUTPATIENT SERVICES | Facility: HOSPITAL | Age: 34
LOS: 1 days | End: 2024-12-09
Payer: COMMERCIAL

## 2024-12-09 DIAGNOSIS — Z98.890 OTHER SPECIFIED POSTPROCEDURAL STATES: Chronic | ICD-10-CM

## 2024-12-09 DIAGNOSIS — Z00.8 ENCOUNTER FOR OTHER GENERAL EXAMINATION: ICD-10-CM

## 2024-12-09 DIAGNOSIS — K08.409 PARTIAL LOSS OF TEETH, UNSPECIFIED CAUSE, UNSPECIFIED CLASS: Chronic | ICD-10-CM

## 2024-12-09 DIAGNOSIS — Z98.82 BREAST IMPLANT STATUS: Chronic | ICD-10-CM

## 2024-12-09 DIAGNOSIS — Z12.39 ENCOUNTER FOR OTHER SCREENING FOR MALIGNANT NEOPLASM OF BREAST: ICD-10-CM

## 2024-12-09 PROCEDURE — 77063 BREAST TOMOSYNTHESIS BI: CPT

## 2024-12-09 PROCEDURE — 77067 SCR MAMMO BI INCL CAD: CPT

## 2024-12-09 PROCEDURE — 77067 SCR MAMMO BI INCL CAD: CPT | Mod: 26

## 2024-12-09 PROCEDURE — 77063 BREAST TOMOSYNTHESIS BI: CPT | Mod: 26

## 2025-04-02 ENCOUNTER — APPOINTMENT (OUTPATIENT)
Dept: OBGYN | Facility: CLINIC | Age: 35
End: 2025-04-02
Payer: COMMERCIAL

## 2025-04-02 VITALS
DIASTOLIC BLOOD PRESSURE: 74 MMHG | BODY MASS INDEX: 23.99 KG/M2 | SYSTOLIC BLOOD PRESSURE: 120 MMHG | HEART RATE: 80 BPM | WEIGHT: 144 LBS | HEIGHT: 65 IN | RESPIRATION RATE: 18 BRPM

## 2025-04-02 DIAGNOSIS — Z30.41 ENCOUNTER FOR SURVEILLANCE OF CONTRACEPTIVE PILLS: ICD-10-CM

## 2025-04-02 PROCEDURE — 99214 OFFICE O/P EST MOD 30 MIN: CPT

## 2025-04-08 DIAGNOSIS — J30.9 ALLERGIC RHINITIS, UNSPECIFIED: ICD-10-CM

## 2025-04-08 RX ORDER — AZELASTINE 137 UG/1
137 SPRAY, METERED NASAL
Qty: 1 | Refills: 3 | Status: ACTIVE | COMMUNITY
Start: 2025-04-08 | End: 1900-01-01

## 2025-04-15 ENCOUNTER — APPOINTMENT (OUTPATIENT)
Dept: DERMATOLOGY | Facility: CLINIC | Age: 35
End: 2025-04-15